# Patient Record
Sex: FEMALE | Race: WHITE | NOT HISPANIC OR LATINO | Employment: UNEMPLOYED | URBAN - NONMETROPOLITAN AREA
[De-identification: names, ages, dates, MRNs, and addresses within clinical notes are randomized per-mention and may not be internally consistent; named-entity substitution may affect disease eponyms.]

---

## 2017-10-25 ENCOUNTER — TELEPHONE (OUTPATIENT)
Dept: URGENT CARE | Facility: CLINIC | Age: 37
End: 2017-10-25

## 2017-10-25 NOTE — TELEPHONE ENCOUNTER
PER [facility] WE ARE SENDING IN A DIFFERENT SCRIPT TO [facility] D/T THE NEED FOR A PA FOR AUGMENTIN.  OMNICEF 300MG BID X7 DAYS CALLED IN    Marlen Razo RN 10/25/2017 9:48 AM

## 2017-11-03 ENCOUNTER — OFFICE VISIT (OUTPATIENT)
Dept: OBSTETRICS AND GYNECOLOGY | Facility: CLINIC | Age: 37
End: 2017-11-03

## 2017-11-03 VITALS
HEIGHT: 65 IN | SYSTOLIC BLOOD PRESSURE: 100 MMHG | BODY MASS INDEX: 17.33 KG/M2 | WEIGHT: 104 LBS | DIASTOLIC BLOOD PRESSURE: 80 MMHG

## 2017-11-03 DIAGNOSIS — Z01.419 ENCOUNTER FOR GYNECOLOGICAL EXAMINATION WITHOUT ABNORMAL FINDING: Primary | ICD-10-CM

## 2017-11-03 DIAGNOSIS — N76.0 ACUTE VAGINITIS: ICD-10-CM

## 2017-11-03 DIAGNOSIS — Z12.31 ENCOUNTER FOR SCREENING MAMMOGRAM FOR MALIGNANT NEOPLASM OF BREAST: ICD-10-CM

## 2017-11-03 PROCEDURE — 87661 TRICHOMONAS VAGINALIS AMPLIF: CPT | Performed by: NURSE PRACTITIONER

## 2017-11-03 PROCEDURE — 87798 DETECT AGENT NOS DNA AMP: CPT | Performed by: NURSE PRACTITIONER

## 2017-11-03 PROCEDURE — 87591 N.GONORRHOEAE DNA AMP PROB: CPT | Performed by: NURSE PRACTITIONER

## 2017-11-03 PROCEDURE — 87481 CANDIDA DNA AMP PROBE: CPT | Performed by: NURSE PRACTITIONER

## 2017-11-03 PROCEDURE — 87624 HPV HI-RISK TYP POOLED RSLT: CPT | Performed by: NURSE PRACTITIONER

## 2017-11-03 PROCEDURE — 87512 GARDNER VAG DNA QUANT: CPT | Performed by: NURSE PRACTITIONER

## 2017-11-03 PROCEDURE — 99385 PREV VISIT NEW AGE 18-39: CPT | Performed by: NURSE PRACTITIONER

## 2017-11-03 PROCEDURE — 87491 CHLMYD TRACH DNA AMP PROBE: CPT | Performed by: NURSE PRACTITIONER

## 2017-11-03 PROCEDURE — G0123 SCREEN CERV/VAG THIN LAYER: HCPCS | Performed by: NURSE PRACTITIONER

## 2017-11-03 RX ORDER — METRONIDAZOLE 500 MG/1
500 TABLET ORAL 2 TIMES DAILY
Qty: 14 TABLET | Refills: 0 | Status: SHIPPED | OUTPATIENT
Start: 2017-11-03 | End: 2017-11-10

## 2017-11-03 NOTE — PROGRESS NOTES
"Pam Jenkins is a 36 y.o. female.     HPI Comments: Annual exam.     The following portions of the patient's history were reviewed and updated as appropriate: allergies, current medications, past family history, past medical history, past social history, past surgical history and problem list.    /80  Ht 65\" (165.1 cm)  Wt 104 lb (47.2 kg)  LMP 10/17/2017  BMI 17.31 kg/m2    Review of Systems   Constitutional: Negative for activity change, appetite change, fatigue and fever.   HENT: Negative for congestion, sore throat and trouble swallowing.    Eyes: Negative for pain, discharge and visual disturbance.   Respiratory: Negative for apnea, shortness of breath and wheezing.    Cardiovascular: Negative for chest pain, palpitations and leg swelling.   Gastrointestinal: Negative for abdominal pain, constipation and diarrhea.   Genitourinary: Positive for pelvic pain (cramping and discomfort. ) and vaginal discharge (increased). Negative for frequency and urgency.   Musculoskeletal: Negative for back pain and gait problem.   Skin: Negative for color change and rash.   Neurological: Negative for dizziness, weakness and numbness.   Psychiatric/Behavioral: Negative for confusion and sleep disturbance.       Objective   Physical Exam   Constitutional: She is oriented to person, place, and time. She appears well-developed and well-nourished. No distress.   HENT:   Head: Normocephalic.   Right Ear: External ear normal.   Left Ear: External ear normal.   Nose: Nose normal.   Mouth/Throat: Oropharynx is clear and moist.   Eyes: Conjunctivae are normal. Right eye exhibits no discharge. Left eye exhibits no discharge. No scleral icterus.   Neck: Normal range of motion. Neck supple. Carotid bruit is not present. No tracheal deviation present. No thyromegaly present.   Cardiovascular: Normal rate, regular rhythm, normal heart sounds and intact distal pulses.    No murmur heard.  Pulmonary/Chest: Effort " normal and breath sounds normal. No respiratory distress. She has no wheezes. Right breast exhibits no inverted nipple, no mass, no nipple discharge, no skin change and no tenderness. Left breast exhibits no inverted nipple, no mass, no nipple discharge, no skin change and no tenderness. Breasts are symmetrical. There is no breast swelling.   Abdominal: Soft. She exhibits no distension and no mass. There is no tenderness. There is no guarding. No hernia. Hernia confirmed negative in the right inguinal area and confirmed negative in the left inguinal area.   Genitourinary: Rectum normal and uterus normal. Rectal exam shows no mass. No breast tenderness, discharge or bleeding. Pelvic exam was performed with patient supine. There is no rash, tenderness, lesion or injury on the right labia. There is no rash, tenderness, lesion or injury on the left labia. Uterus is not enlarged, not fixed and not tender. Cervix exhibits no motion tenderness, no discharge and no friability. Right adnexum displays no mass, no tenderness and no fullness. Left adnexum displays no mass, no tenderness and no fullness. No erythema, tenderness or bleeding in the vagina. No foreign body in the vagina. No signs of injury around the vagina. Vaginal discharge (yellow with odor) found.   Genitourinary Comments: BSU normal  Urethral meatus  Normal  Perineum  Normal   Musculoskeletal: Normal range of motion. She exhibits no edema or tenderness.   Lymphadenopathy:        Head (right side): No submental, no submandibular, no tonsillar, no preauricular, no posterior auricular and no occipital adenopathy present.        Head (left side): No submental, no submandibular, no tonsillar, no preauricular, no posterior auricular and no occipital adenopathy present.     She has no cervical adenopathy.        Right cervical: No superficial cervical, no deep cervical and no posterior cervical adenopathy present.       Left cervical: No superficial cervical, no deep  cervical and no posterior cervical adenopathy present.     She has no axillary adenopathy.        Right: No inguinal adenopathy present.        Left: No inguinal adenopathy present.   Neurological: She is alert and oriented to person, place, and time. Coordination normal.   Skin: Skin is warm and dry. No bruising and no rash noted. She is not diaphoretic. No erythema.   Psychiatric: She has a normal mood and affect. Her behavior is normal. Judgment and thought content normal.   Nursing note and vitals reviewed.      Assessment/Plan    Well woman exam. Pap collected, BV panel, gonorrhea and chlamydia testing added.   Baseline mammogram ordered r/t pt family history of breast cancer.   RV annual exam/prn     Yue was seen today for gynecologic exam.    Diagnoses and all orders for this visit:    Encounter for gynecological examination without abnormal finding  -     Liquid-based Pap Smear, Screening - ThinPrep Vial, Cervix    Acute vaginitis    Encounter for screening mammogram for malignant neoplasm of breast  -     Mammo Screening Digital Tomosynthesis Bilateral With CAD; Future    Other orders  -     metroNIDAZOLE (FLAGYL) 500 MG tablet; Take 1 tablet by mouth 2 (Two) Times a Day for 7 days.

## 2017-11-07 ENCOUNTER — HOSPITAL ENCOUNTER (OUTPATIENT)
Dept: MAMMOGRAPHY | Facility: HOSPITAL | Age: 37
Discharge: HOME | End: 2017-11-07

## 2017-11-07 DIAGNOSIS — Z12.31 ENCOUNTER FOR SCREENING MAMMOGRAM FOR MALIGNANT NEOPLASM OF BREAST: ICD-10-CM

## 2017-11-08 ENCOUNTER — TELEPHONE (OUTPATIENT)
Dept: OBSTETRICS AND GYNECOLOGY | Facility: CLINIC | Age: 37
End: 2017-11-08

## 2017-11-08 DIAGNOSIS — A49.3 MYCOPLASMA INFECTION: Primary | ICD-10-CM

## 2017-11-08 RX ORDER — DOXYCYCLINE HYCLATE 100 MG/1
100 CAPSULE ORAL 2 TIMES DAILY
Qty: 14 CAPSULE | Refills: 0 | Status: SHIPPED | OUTPATIENT
Start: 2017-11-08 | End: 2017-11-15

## 2017-11-08 NOTE — TELEPHONE ENCOUNTER
Pt informed and voiced understanding. Understands to finish the flagyl and will start doxy. Transferred to scheduling to come in 4-6 weeks for test of cure. Pt also to be treated.

## 2017-11-09 ENCOUNTER — TELEPHONE (OUTPATIENT)
Dept: OBSTETRICS AND GYNECOLOGY | Facility: CLINIC | Age: 37
End: 2017-11-09

## 2017-11-10 LAB
GEN CATEG CVX/VAG CYTO-IMP: ABNORMAL
LAB AP CASE REPORT: ABNORMAL
LAB AP GYN ADDITIONAL INFORMATION: ABNORMAL
LAB AP GYN OTHER FINDINGS: ABNORMAL
Lab: ABNORMAL
PATH INTERP SPEC-IMP: ABNORMAL
STAT OF ADQ CVX/VAG CYTO-IMP: ABNORMAL

## 2017-12-06 ENCOUNTER — OFFICE VISIT (OUTPATIENT)
Dept: OBSTETRICS AND GYNECOLOGY | Facility: CLINIC | Age: 37
End: 2017-12-06

## 2017-12-06 VITALS
HEIGHT: 65 IN | BODY MASS INDEX: 17.49 KG/M2 | WEIGHT: 105 LBS | SYSTOLIC BLOOD PRESSURE: 96 MMHG | DIASTOLIC BLOOD PRESSURE: 60 MMHG

## 2017-12-06 DIAGNOSIS — N89.8 VAGINAL DISCHARGE: ICD-10-CM

## 2017-12-06 DIAGNOSIS — Z86.19 HISTORY OF TRICHOMONAL VAGINITIS: Primary | ICD-10-CM

## 2017-12-06 PROCEDURE — 87591 N.GONORRHOEAE DNA AMP PROB: CPT | Performed by: NURSE PRACTITIONER

## 2017-12-06 PROCEDURE — 99213 OFFICE O/P EST LOW 20 MIN: CPT | Performed by: NURSE PRACTITIONER

## 2017-12-06 PROCEDURE — 87481 CANDIDA DNA AMP PROBE: CPT | Performed by: NURSE PRACTITIONER

## 2017-12-06 PROCEDURE — 87491 CHLMYD TRACH DNA AMP PROBE: CPT | Performed by: NURSE PRACTITIONER

## 2017-12-06 PROCEDURE — 87798 DETECT AGENT NOS DNA AMP: CPT | Performed by: NURSE PRACTITIONER

## 2017-12-06 PROCEDURE — 87661 TRICHOMONAS VAGINALIS AMPLIF: CPT | Performed by: NURSE PRACTITIONER

## 2017-12-06 PROCEDURE — 87512 GARDNER VAG DNA QUANT: CPT | Performed by: NURSE PRACTITIONER

## 2017-12-06 NOTE — PROGRESS NOTES
"Subjective   Yue Jenkins is a 37 y.o. female.     HPI Comments: Follow up.     Pt tested positive for Trichomonas, mycoplasma hominis and ureaplasma on 11/3/2017.  Pt reports she took the medication and partner was treated. Today is a GREGORIO.      The following portions of the patient's history were reviewed and updated as appropriate: allergies, current medications, past family history, past medical history, past social history, past surgical history and problem list.    BP 96/60 (BP Location: Left arm, Patient Position: Sitting, Cuff Size: Adult)  Ht 165.1 cm (65\")  Wt 47.6 kg (105 lb)  LMP 11/26/2017 (Exact Date)  BMI 17.47 kg/m2    Review of Systems   Constitutional: Negative for activity change, appetite change, fatigue and fever.   Respiratory: Negative for apnea and shortness of breath.    Cardiovascular: Negative for chest pain and palpitations.   Gastrointestinal: Negative for abdominal distention, abdominal pain, constipation, diarrhea, nausea and vomiting.   Endocrine: Negative for cold intolerance and heat intolerance.   Genitourinary: Negative for difficulty urinating, frequency, menstrual problem, pelvic pain, vaginal discharge and vaginal pain.   Neurological: Negative for headaches.   Psychiatric/Behavioral: Negative for agitation and sleep disturbance.       Objective   Physical Exam   Constitutional: She is oriented to person, place, and time. She appears well-developed.   HENT:   Head: Normocephalic.   Neck: No tracheal deviation present.   Cardiovascular: Normal rate.    Pulmonary/Chest: Breath sounds normal. She has no wheezes.   Abdominal: Soft. There is no tenderness.   Genitourinary: Rectum normal. There is no rash, tenderness or lesion on the right labia. There is no rash, tenderness or lesion on the left labia. Uterus is not deviated, not enlarged and not tender. Cervix exhibits no motion tenderness and no discharge. Right adnexum displays no mass, no tenderness and no fullness. Left " adnexum displays no mass, no tenderness and no fullness. No erythema or tenderness in the vagina. No vaginal discharge found.   Lymphadenopathy:        Right: No inguinal adenopathy present.        Left: No inguinal adenopathy present.   Neurological: She is alert and oriented to person, place, and time. She has normal strength.   Skin: Skin is warm and dry. No rash noted. No cyanosis.   Psychiatric: She has a normal mood and affect. Her speech is normal and behavior is normal.       Assessment/Plan    Reviewed previous results, pt was positive for Trichomonas, mycoplasma hominis and ureaplasma.  Discussed previous results with pt, pt voiced understanding.   Specimen collected for BV panel, gonorrhea and chlamydia testing. Pt   RV annual exam/prn.   Yue was seen today for follow-up.    Diagnoses and all orders for this visit:    History of trichomonal vaginitis  -     Gynecologic Fluid, Supplemental Testing - ThinPrep Vial, Cervix    Vaginal discharge

## 2017-12-11 LAB
GEN CATEG CVX/VAG CYTO-IMP: NORMAL
LAB AP CASE REPORT: NORMAL
Lab: NORMAL
PATH INTERP SPEC-IMP: NORMAL
STAT OF ADQ CVX/VAG CYTO-IMP: NORMAL

## 2017-12-12 ENCOUNTER — TELEPHONE (OUTPATIENT)
Dept: OBSTETRICS AND GYNECOLOGY | Facility: CLINIC | Age: 37
End: 2017-12-12

## 2017-12-12 RX ORDER — DOXYCYCLINE HYCLATE 100 MG/1
100 CAPSULE ORAL 2 TIMES DAILY
Qty: 14 CAPSULE | Refills: 0 | Status: SHIPPED | OUTPATIENT
Start: 2017-12-12 | End: 2017-12-19

## 2019-02-19 ENCOUNTER — OFFICE VISIT (OUTPATIENT)
Dept: RETAIL CLINIC | Facility: CLINIC | Age: 39
End: 2019-02-19

## 2019-02-19 VITALS
OXYGEN SATURATION: 98 % | TEMPERATURE: 98.4 F | HEART RATE: 72 BPM | RESPIRATION RATE: 20 BRPM | DIASTOLIC BLOOD PRESSURE: 64 MMHG | SYSTOLIC BLOOD PRESSURE: 112 MMHG

## 2019-02-19 DIAGNOSIS — J32.9 CHRONIC SINUSITIS, UNSPECIFIED LOCATION: Primary | ICD-10-CM

## 2019-02-19 DIAGNOSIS — G89.29 CHRONIC DENTAL PAIN: ICD-10-CM

## 2019-02-19 DIAGNOSIS — K08.9 CHRONIC DENTAL PAIN: ICD-10-CM

## 2019-02-19 PROCEDURE — 99213 OFFICE O/P EST LOW 20 MIN: CPT | Performed by: NURSE PRACTITIONER

## 2019-02-19 RX ORDER — AMOXICILLIN AND CLAVULANATE POTASSIUM 875; 125 MG/1; MG/1
1 TABLET, FILM COATED ORAL 2 TIMES DAILY
Qty: 20 TABLET | Refills: 0 | Status: SHIPPED | OUTPATIENT
Start: 2019-02-19 | End: 2019-03-01

## 2019-02-19 RX ORDER — FLUTICASONE PROPIONATE 50 MCG
2 SPRAY, SUSPENSION (ML) NASAL DAILY
Qty: 18.2 ML | Refills: 0 | Status: SHIPPED | OUTPATIENT
Start: 2019-02-19 | End: 2019-03-29

## 2019-02-19 NOTE — PATIENT INSTRUCTIONS
Drink plenty of fluids (water or Gatorade)   May take tylenol or ibuprofen for mild pain or fever   May take Mucinex as directed for congestion if needed   Return in 7-10 days if symptoms persist or worsen    Sinusitis, Adult  Sinusitis is soreness and inflammation of your sinuses. Sinuses are hollow spaces in the bones around your face. Your sinuses are located:  · Around your eyes.  · In the middle of your forehead.  · Behind your nose.  · In your cheekbones.    Your sinuses and nasal passages are lined with a stringy fluid (mucus). Mucus normally drains out of your sinuses. When your nasal tissues become inflamed or swollen, the mucus can become trapped or blocked so air cannot flow through your sinuses. This allows bacteria, viruses, and funguses to grow, which leads to infection.  Sinusitis can develop quickly and last for 7?10 days (acute) or for more than 12 weeks (chronic). Sinusitis often develops after a cold.  What are the causes?  This condition is caused by anything that creates swelling in the sinuses or stops mucus from draining, including:  · Allergies.  · Asthma.  · Bacterial or viral infection.  · Abnormally shaped bones between the nasal passages.  · Nasal growths that contain mucus (nasal polyps).  · Narrow sinus openings.  · Pollutants, such as chemicals or irritants in the air.  · A foreign object stuck in the nose.  · A fungal infection. This is rare.    What increases the risk?  The following factors may make you more likely to develop this condition:  · Having allergies or asthma.  · Having had a recent cold or respiratory tract infection.  · Having structural deformities or blockages in your nose or sinuses.  · Having a weak immune system.  · Doing a lot of swimming or diving.  · Overusing nasal sprays.  · Smoking.    What are the signs or symptoms?  The main symptoms of this condition are pain and a feeling of pressure around the affected sinuses. Other symptoms include:  · Upper  toothache.  · Earache.  · Headache.  · Bad breath.  · Decreased sense of smell and taste.  · A cough that may get worse at night.  · Fatigue.  · Fever.  · Thick drainage from your nose. The drainage is often green and it may contain pus (purulent).  · Stuffy nose or congestion.  · Postnasal drip. This is when extra mucus collects in the throat or back of the nose.  · Swelling and warmth over the affected sinuses.  · Sore throat.  · Sensitivity to light.    How is this diagnosed?  This condition is diagnosed based on symptoms, a medical history, and a physical exam. To find out if your condition is acute or chronic, your health care provider may:  · Look in your nose for signs of nasal polyps.  · Tap over the affected sinus to check for signs of infection.  · View the inside of your sinuses using an imaging device that has a light attached (endoscope).    If your health care provider suspects that you have chronic sinusitis, you may also:  · Be tested for allergies.  · Have a sample of mucus taken from your nose (nasal culture) and checked for bacteria.  · Have a mucus sample examined to see if your sinusitis is related to an allergy.    If your sinusitis does not respond to treatment and it lasts longer than 8 weeks, you may have an MRI or CT scan to check your sinuses. These scans also help to determine how severe your infection is.  In rare cases, a bone biopsy may be done to rule out more serious types of fungal sinus disease.  How is this treated?  Treatment for sinusitis depends on the cause and whether your condition is chronic or acute. If a virus is causing your sinusitis, your symptoms will go away on their own within 10 days. You may be given medicines to relieve your symptoms, including:  · Topical nasal decongestants. They shrink swollen nasal passages and let mucus drain from your sinuses.  · Antihistamines. These drugs block inflammation that is triggered by allergies. This can help to ease swelling in  your nose and sinuses.  · Topical nasal corticosteroids. These are nasal sprays that ease inflammation and swelling in your nose and sinuses.  · Nasal saline washes. These rinses can help to get rid of thick mucus in your nose.    If your condition is caused by bacteria, you will be given an antibiotic medicine. If your condition is caused by a fungus, you will be given an antifungal medicine.  Surgery may be needed to correct underlying conditions, such as narrow nasal passages. Surgery may also be needed to remove polyps.  Follow these instructions at home:  Medicines  · Take, use, or apply over-the-counter and prescription medicines only as told by your health care provider. These may include nasal sprays.  · If you were prescribed an antibiotic medicine, take it as told by your health care provider. Do not stop taking the antibiotic even if you start to feel better.  Hydrate and Humidify  · Drink enough water to keep your urine clear or pale yellow. Staying hydrated will help to thin your mucus.  · Use a cool mist humidifier to keep the humidity level in your home above 50%.  · Inhale steam for 10-15 minutes, 3-4 times a day or as told by your health care provider. You can do this in the bathroom while a hot shower is running.  · Limit your exposure to cool or dry air.  Rest  · Rest as much as possible.  · Sleep with your head raised (elevated).  · Make sure to get enough sleep each night.  General instructions  · Apply a warm, moist washcloth to your face 3-4 times a day or as told by your health care provider. This will help with discomfort.  · Wash your hands often with soap and water to reduce your exposure to viruses and other germs. If soap and water are not available, use hand .  · Do not smoke. Avoid being around people who are smoking (secondhand smoke).  · Keep all follow-up visits as told by your health care provider. This is important.  Contact a health care provider if:  · You have a  fever.  · Your symptoms get worse.  · Your symptoms do not improve within 10 days.  Get help right away if:  · You have a severe headache.  · You have persistent vomiting.  · You have pain or swelling around your face or eyes.  · You have vision problems.  · You develop confusion.  · Your neck is stiff.  · You have trouble breathing.  This information is not intended to replace advice given to you by your health care provider. Make sure you discuss any questions you have with your health care provider.  Document Released: 12/18/2006 Document Revised: 08/13/2017 Document Reviewed: 10/12/2016  [employer] Interactive Patient Education © 2018 [employer]

## 2019-02-19 NOTE — PROGRESS NOTES
Chief Complaint   Patient presents with   • Earache   • Dental Pain     Subjective   Yue Jenkins is a 38 y.o. female who presents to the clinic today with complaints left ear pain and sinus pressure along with dental pain to the left portion of the upper and lower dental cavities. Dental pain started 3-4 weeks ago and patient saw dentist and stated that there was no abscess and that it was most likely a sinus infection.     Earache    There is pain in the left ear. This is a new problem. The current episode started 1 to 4 weeks ago. The problem occurs constantly. The problem has been gradually worsening. There has been no fever. Associated symptoms include headaches. Pertinent negatives include no abdominal pain, coughing, diarrhea, ear discharge, hearing loss, neck pain, rash, rhinorrhea, sore throat or vomiting. She has tried acetaminophen, antibiotics and NSAIDs for the symptoms. The treatment provided no relief. There is no history of a chronic ear infection, hearing loss or a tympanostomy tube.   Dental Pain    Associated symptoms include sinus pressure.         Current Outpatient Medications:   •  amoxicillin-clavulanate (AUGMENTIN) 875-125 MG per tablet, Take 1 tablet by mouth 2 (Two) Times a Day for 10 days., Disp: 20 tablet, Rfl: 0  •  fluticasone (FLONASE) 50 MCG/ACT nasal spray, 2 sprays into the nostril(s) as directed by provider Daily., Disp: 18.2 mL, Rfl: 0    Allergies:  Patient has no known allergies.    History reviewed. No pertinent past medical history.  Past Surgical History:   Procedure Laterality Date   • DILATATION AND CURETTAGE     • TUBAL ABDOMINAL LIGATION       Family History   Problem Relation Age of Onset   • No Known Problems Mother    • No Known Problems Father    • No Known Problems Sister    • No Known Problems Brother    • No Known Problems Paternal Grandmother    • No Known Problems Brother    • Breast cancer Other    • Ovarian cancer Neg Hx    • Uterine cancer Neg Hx    •  Colon cancer Neg Hx      Social History     Tobacco Use   • Smoking status: Current Every Day Smoker     Packs/day: 0.50     Types: Cigarettes   • Smokeless tobacco: Never Used   Substance Use Topics   • Alcohol use: Yes     Comment: occasional wine   • Drug use: No       Review of Systems  Review of Systems   Constitutional: Positive for activity change, appetite change and fatigue.   HENT: Positive for dental problem, ear pain and sinus pressure. Negative for ear discharge, hearing loss, rhinorrhea, sinus pain, sore throat and trouble swallowing.    Respiratory: Negative for cough, choking, shortness of breath and wheezing.    Gastrointestinal: Negative for abdominal pain, diarrhea, nausea and vomiting.   Musculoskeletal: Negative for back pain, myalgias and neck pain.   Skin: Negative for rash.   Neurological: Positive for headaches.   Hematological: Negative for adenopathy.       Objective   /64 (BP Location: Left arm, Patient Position: Sitting, Cuff Size: Adult)   Pulse 72   Temp 98.4 °F (36.9 °C) (Tympanic)   Resp 20   SpO2 98%       Physical Exam   Constitutional: She is oriented to person, place, and time. She appears well-developed and well-nourished.   HENT:   Head: Normocephalic.   Right Ear: Hearing, external ear and ear canal normal. Tympanic membrane is injected.   Left Ear: Hearing, external ear and ear canal normal. Tympanic membrane is injected.   Mouth/Throat: Oropharynx is clear and moist. Normal dentition. No dental abscesses or dental caries. No posterior oropharyngeal edema or posterior oropharyngeal erythema.   Eyes: Conjunctivae are normal. Pupils are equal, round, and reactive to light.   Neck: Normal range of motion.   Cardiovascular: Normal rate, regular rhythm and normal heart sounds.   Pulmonary/Chest: Effort normal and breath sounds normal. She has no wheezes. She has no rales.   Musculoskeletal: Normal range of motion.   Lymphadenopathy:     She has no cervical adenopathy.    Neurological: She is alert and oriented to person, place, and time.   Skin: Skin is warm and dry.   Psychiatric: She has a normal mood and affect. Her behavior is normal. Judgment and thought content normal.       Assessment/Plan     Yue was seen today for earache and dental pain.    Diagnoses and all orders for this visit:    Chronic sinusitis, unspecified location  -     Ambulatory Referral to ENT (Otolaryngology)    Chronic dental pain    Other orders  -     amoxicillin-clavulanate (AUGMENTIN) 875-125 MG per tablet; Take 1 tablet by mouth 2 (Two) Times a Day for 10 days.  -     fluticasone (FLONASE) 50 MCG/ACT nasal spray; 2 sprays into the nostril(s) as directed by provider Daily.      Drink plenty of fluids (water or Gatorade)   May take tylenol or ibuprofen for mild pain or fever   May take Mucinex as directed for congestion if needed   Return in 7-10 days if symptoms persist or worsen    Justin Dumont NP student, assisted in assessment, diagnosis and treatment plan

## 2019-03-18 ENCOUNTER — OFFICE VISIT (OUTPATIENT)
Dept: OTOLARYNGOLOGY | Facility: CLINIC | Age: 39
End: 2019-03-18

## 2019-03-18 VITALS
DIASTOLIC BLOOD PRESSURE: 70 MMHG | BODY MASS INDEX: 17.11 KG/M2 | TEMPERATURE: 97.8 F | SYSTOLIC BLOOD PRESSURE: 108 MMHG | HEIGHT: 67 IN | WEIGHT: 109 LBS

## 2019-03-18 DIAGNOSIS — K08.9 CHRONIC DENTAL PAIN: ICD-10-CM

## 2019-03-18 DIAGNOSIS — R51.9 FACIAL PAIN: ICD-10-CM

## 2019-03-18 DIAGNOSIS — M26.609 TMJ (TEMPOROMANDIBULAR JOINT DISORDER): ICD-10-CM

## 2019-03-18 DIAGNOSIS — J30.9 ALLERGIC RHINITIS, UNSPECIFIED SEASONALITY, UNSPECIFIED TRIGGER: Primary | ICD-10-CM

## 2019-03-18 DIAGNOSIS — R68.84 JAW PAIN: ICD-10-CM

## 2019-03-18 DIAGNOSIS — G89.29 CHRONIC DENTAL PAIN: ICD-10-CM

## 2019-03-18 PROCEDURE — 99214 OFFICE O/P EST MOD 30 MIN: CPT | Performed by: NURSE PRACTITIONER

## 2019-03-18 RX ORDER — METHYLPREDNISOLONE 4 MG/1
TABLET ORAL
Qty: 1 EACH | Refills: 0 | Status: SHIPPED | OUTPATIENT
Start: 2019-03-18 | End: 2019-03-24

## 2019-03-18 NOTE — PATIENT INSTRUCTIONS
Temporomandibular Joint Syndrome  Temporomandibular joint (TMJ) syndrome is a condition that affects the joints between your jaw and your skull. The TMJs are located near your ears and allow your jaw to open and close. These joints and the nearby muscles are involved in all movements of the jaw. People with TMJ syndrome have pain in the area of these joints and muscles. Chewing, biting, or other movements of the jaw can be difficult or painful.  TMJ syndrome can be caused by various things. In many cases, the condition is mild and goes away within a few weeks. For some people, the condition can become a long-term problem.  What are the causes?  Possible causes of TMJ syndrome include:  · Grinding your teeth or clenching your jaw. Some people do this when they are under stress.  · Arthritis.  · Injury to the jaw.  · Head or neck injury.  · Teeth or dentures that are not aligned well.    In some cases, the cause of TMJ syndrome may not be known.  What are the signs or symptoms?  The most common symptom is an aching pain on the side of the head in the area of the TMJ. Other symptoms may include:  · Pain when moving your jaw, such as when chewing or biting.  · Being unable to open your jaw all the way.  · Making a clicking sound when you open your mouth.  · Headache.  · Earache.  · Neck or shoulder pain.    How is this diagnosed?  Diagnosis can usually be made based on your symptoms, your medical history, and a physical exam. Your health care provider may check the range of motion of your jaw. Imaging tests, such as X-rays or an MRI, are sometimes done. You may need to see your dentist to determine if your teeth and jaw are lined up correctly.  How is this treated?  TMJ syndrome often goes away on its own. If treatment is needed, the options may include:  · Eating soft foods and applying ice or heat.  · Medicines to relieve pain or inflammation.  · Medicines to relax the muscles.  · A splint, bite plate, or mouthpiece  to prevent teeth grinding or jaw clenching.  · Relaxation techniques or counseling to help reduce stress.  · Transcutaneous electrical nerve stimulation (TENS). This helps to relieve pain by applying an electrical current through the skin.  · Acupuncture. This is sometimes helpful to relieve pain.  · Jaw surgery. This is rarely needed.    Follow these instructions at home:  · Take medicines only as directed by your health care provider.  · Eat a soft diet if you are having trouble chewing.  · Apply ice to the painful area.  ? Put ice in a plastic bag.  ? Place a towel between your skin and the bag.  ? Leave the ice on for 20 minutes, 2-3 times a day.  · Apply a warm compress to the painful area as directed.  · Massage your jaw area and perform any jaw stretching exercises as recommended by your health care provider.  · If you were given a mouthpiece or bite plate, wear it as directed.  · Avoid foods that require a lot of chewing. Do not chew gum.  · Keep all follow-up visits as directed by your health care provider. This is important.  Contact a health care provider if:  · You are having trouble eating.  · You have new or worsening symptoms.  Get help right away if:  · Your jaw locks open or closed.  This information is not intended to replace advice given to you by your health care provider. Make sure you discuss any questions you have with your health care provider.  Document Released: 09/12/2002 Document Revised: 08/17/2017 Document Reviewed: 07/23/2015  [employer] Interactive Patient Education © 2019 [employer]

## 2019-03-18 NOTE — PROGRESS NOTES
YOB: 1980  Location: [facility]  Location Address: [address] [place] [address], [facility], [address] [place] [address]  Location Phone: [phone]    Chief Complaint   Patient presents with   • Sinus Problem       History of Present Illness  Yue Jenkins is a 38 y.o. female.  Yue Jenkins is here for evaluation of ENT complaints. The patient has had problems with nasal congestion, repeated sinusitis, sinus pressure, facial pressure and headaches  The symptoms are not localized to a particular location. The patient has had moderate symptoms. The symptoms have been present for the last several months The symptoms are aggravated by  no identifiable factors. The symptoms are improved by no identifiable factors.  She did have her teeth examined for a dental process however nothing was going on on the affected left side.  She has been eating peanuts.       History reviewed. No pertinent past medical history.    Past Surgical History:   Procedure Laterality Date   • DILATATION AND CURETTAGE     • TUBAL ABDOMINAL LIGATION         Outpatient Medications Marked as Taking for the 3/18/19 encounter (Office Visit) with Joana Forbes APRN   Medication Sig Dispense Refill   • fluticasone (FLONASE) 50 MCG/ACT nasal spray 2 sprays into the nostril(s) as directed by provider Daily. 18.2 mL 0       Patient has no known allergies.    Family History   Problem Relation Age of Onset   • No Known Problems Mother    • No Known Problems Father    • No Known Problems Sister    • No Known Problems Brother    • No Known Problems Paternal Grandmother    • No Known Problems Brother    • Breast cancer Other    • Ovarian cancer Neg Hx    • Uterine cancer Neg Hx    • Colon cancer Neg Hx        Social History     Socioeconomic History   • Marital status:      Spouse name: Not on file   • Number of children: Not on file   • Years of education: Not on file   • Highest education level: Not on file   Social Needs   • Financial resource  strain: Not on file   • Food insecurity - worry: Not on file   • Food insecurity - inability: Not on file   • Transportation needs - medical: Not on file   • Transportation needs - non-medical: Not on file   Occupational History   • Not on file   Tobacco Use   • Smoking status: Current Every Day Smoker     Packs/day: 0.50     Types: Cigarettes   • Smokeless tobacco: Never Used   Substance and Sexual Activity   • Alcohol use: Yes     Comment: occasional wine   • Drug use: No   • Sexual activity: Yes   Other Topics Concern   • Not on file   Social History Narrative   • Not on file       Review of Systems   Constitutional: Negative.    HENT:        SEE HPI   Eyes: Negative.    Respiratory: Negative.    Cardiovascular: Negative.    Gastrointestinal: Negative.    Endocrine: Negative.    Genitourinary: Negative.    Musculoskeletal: Negative.    Skin: Negative.    Allergic/Immunologic: Positive for environmental allergies.   Neurological: Negative.    Hematological: Negative.    Psychiatric/Behavioral: Negative.        Vitals:    03/18/19 0917   BP: 108/70   Temp: 97.8 °F (36.6 °C)       Body mass index is 17.07 kg/m².    Objective     Physical Exam  CONSTITUTIONAL: well nourished, alert, oriented, in no acute distress     COMMUNICATION AND VOICE: able to communicate normally, normal voice quality    HEAD: normocephalic, no lesions, atraumatic, no tenderness, no masses     FACE: appearance normal, no lesions, no tenderness, no deformities, facial motion symmetric    SALIVARY GLANDS: parotid glands with no tenderness, no swelling, no masses, submandibular glands with normal size, nontender    EYES: ocular motility normal, eyelids normal, orbits normal, no proptosis, conjunctiva normal , pupils equal, round     EARS:  Hearing: response to conversational voice normal bilaterally   External Ears: auricles without lesions  Otoscopic: tympanic membrane appearance normal, no lesions, no perforation, normal mobility, no  fluid    NOSE:  External Nose: structure normal, no tenderness on palpation, no nasal discharge, no lesions, no evidence of trauma, nostrils patent   Intranasal Exam: nasal mucosa normal, vestibule within normal limits, inferior turbinate normal, nasal septum midline     ORAL:  Lips: upper and lower lips without lesion   Teeth: dentition within normal limits for age   Gums: gingivae healthy   Oral Mucosa: oral mucosa normal, no mucosal lesions   Floor of Mouth: Warthin’s duct patent, mucosa normal  Tongue: lingual mucosa normal without lesions, normal tongue mobility   Palate: soft and hard palates with normal mucosa and structure  Oropharynx: oropharyngeal mucosa normal    NECK: neck appearance normal, no mass,  noted without erythema or tenderness    LYMPH NODES: no lymphadenopathy    CHEST/RESPIRATORY: respiratory effort normal,    CARDIOVASCULAR: extremities without cyanosis or edema      NEUROLOGIC/PSYCHIATRIC: oriented to time, place and person, mood normal, affect appropriate, CN II-XII intact grossly    Assessment/Plan   Yue was seen today for sinus problem.    Diagnoses and all orders for this visit:    Allergic rhinitis, unspecified seasonality, unspecified trigger  -     CT sinus wo contrast; Future    Facial pain  -     CT sinus wo contrast; Future    Chronic dental pain  -     CT sinus wo contrast; Future    Jaw pain  -     CT sinus wo contrast; Future    TMJ (temporomandibular joint disorder)  -     CT sinus wo contrast; Future    Other orders  -     methylPREDNISolone (MEDROL) 4 MG tablet; Take as directed on package instructions.      * Surgery not found *  Orders Placed This Encounter   Procedures   • CT sinus wo contrast     Standing Status:   Future     Standing Expiration Date:   3/18/2020     Scheduling Instructions:      Image guided, burn CD, stealth protocol     Order Specific Question:   Patient Pregnant     Answer:   No     Order Specific Question:   Is this exam for pre-operative  purposes?     Answer:   Yes     Return in about 6 weeks (around 4/29/2019).       Patient Instructions   Temporomandibular Joint Syndrome  Temporomandibular joint (TMJ) syndrome is a condition that affects the joints between your jaw and your skull. The TMJs are located near your ears and allow your jaw to open and close. These joints and the nearby muscles are involved in all movements of the jaw. People with TMJ syndrome have pain in the area of these joints and muscles. Chewing, biting, or other movements of the jaw can be difficult or painful.  TMJ syndrome can be caused by various things. In many cases, the condition is mild and goes away within a few weeks. For some people, the condition can become a long-term problem.  What are the causes?  Possible causes of TMJ syndrome include:  · Grinding your teeth or clenching your jaw. Some people do this when they are under stress.  · Arthritis.  · Injury to the jaw.  · Head or neck injury.  · Teeth or dentures that are not aligned well.    In some cases, the cause of TMJ syndrome may not be known.  What are the signs or symptoms?  The most common symptom is an aching pain on the side of the head in the area of the TMJ. Other symptoms may include:  · Pain when moving your jaw, such as when chewing or biting.  · Being unable to open your jaw all the way.  · Making a clicking sound when you open your mouth.  · Headache.  · Earache.  · Neck or shoulder pain.    How is this diagnosed?  Diagnosis can usually be made based on your symptoms, your medical history, and a physical exam. Your health care provider may check the range of motion of your jaw. Imaging tests, such as X-rays or an MRI, are sometimes done. You may need to see your dentist to determine if your teeth and jaw are lined up correctly.  How is this treated?  TMJ syndrome often goes away on its own. If treatment is needed, the options may include:  · Eating soft foods and applying ice or heat.  · Medicines to  relieve pain or inflammation.  · Medicines to relax the muscles.  · A splint, bite plate, or mouthpiece to prevent teeth grinding or jaw clenching.  · Relaxation techniques or counseling to help reduce stress.  · Transcutaneous electrical nerve stimulation (TENS). This helps to relieve pain by applying an electrical current through the skin.  · Acupuncture. This is sometimes helpful to relieve pain.  · Jaw surgery. This is rarely needed.    Follow these instructions at home:  · Take medicines only as directed by your health care provider.  · Eat a soft diet if you are having trouble chewing.  · Apply ice to the painful area.  ? Put ice in a plastic bag.  ? Place a towel between your skin and the bag.  ? Leave the ice on for 20 minutes, 2-3 times a day.  · Apply a warm compress to the painful area as directed.  · Massage your jaw area and perform any jaw stretching exercises as recommended by your health care provider.  · If you were given a mouthpiece or bite plate, wear it as directed.  · Avoid foods that require a lot of chewing. Do not chew gum.  · Keep all follow-up visits as directed by your health care provider. This is important.  Contact a health care provider if:  · You are having trouble eating.  · You have new or worsening symptoms.  Get help right away if:  · Your jaw locks open or closed.  This information is not intended to replace advice given to you by your health care provider. Make sure you discuss any questions you have with your health care provider.  Document Released: 09/12/2002 Document Revised: 08/17/2017 Document Reviewed: 07/23/2015  [employer] Patient Education © 2019 [employer]

## 2019-03-22 ENCOUNTER — APPOINTMENT (OUTPATIENT)
Dept: CT IMAGING | Facility: HOSPITAL | Age: 39
End: 2019-03-22

## 2019-03-27 ENCOUNTER — HOSPITAL ENCOUNTER (OUTPATIENT)
Dept: CT IMAGING | Facility: HOSPITAL | Age: 39
Discharge: HOME | End: 2019-03-27
Admitting: NURSE PRACTITIONER

## 2019-03-27 DIAGNOSIS — G89.29 CHRONIC DENTAL PAIN: ICD-10-CM

## 2019-03-27 DIAGNOSIS — K08.9 CHRONIC DENTAL PAIN: ICD-10-CM

## 2019-03-27 DIAGNOSIS — R51.9 FACIAL PAIN: ICD-10-CM

## 2019-03-27 DIAGNOSIS — J30.9 ALLERGIC RHINITIS, UNSPECIFIED SEASONALITY, UNSPECIFIED TRIGGER: ICD-10-CM

## 2019-03-27 DIAGNOSIS — M26.609 TMJ (TEMPOROMANDIBULAR JOINT DISORDER): ICD-10-CM

## 2019-03-27 DIAGNOSIS — R68.84 JAW PAIN: ICD-10-CM

## 2019-03-27 PROCEDURE — 70486 CT MAXILLOFACIAL W/O DYE: CPT

## 2019-03-29 ENCOUNTER — APPOINTMENT (OUTPATIENT)
Dept: CT IMAGING | Facility: HOSPITAL | Age: 39
End: 2019-03-29

## 2019-03-29 ENCOUNTER — OFFICE VISIT (OUTPATIENT)
Dept: INTERNAL MEDICINE | Facility: CLINIC | Age: 39
End: 2019-03-29

## 2019-03-29 ENCOUNTER — LAB (OUTPATIENT)
Dept: LAB | Facility: HOSPITAL | Age: 39
End: 2019-03-29

## 2019-03-29 VITALS
HEART RATE: 84 BPM | SYSTOLIC BLOOD PRESSURE: 108 MMHG | TEMPERATURE: 98.7 F | DIASTOLIC BLOOD PRESSURE: 74 MMHG | WEIGHT: 105.38 LBS | OXYGEN SATURATION: 99 % | BODY MASS INDEX: 16.54 KG/M2 | HEIGHT: 67 IN | RESPIRATION RATE: 12 BRPM

## 2019-03-29 DIAGNOSIS — R68.84 JAW PAIN: Primary | ICD-10-CM

## 2019-03-29 DIAGNOSIS — F32.A ANXIETY AND DEPRESSION: ICD-10-CM

## 2019-03-29 DIAGNOSIS — Z13.220 LIPID SCREENING: ICD-10-CM

## 2019-03-29 DIAGNOSIS — F41.9 ANXIETY AND DEPRESSION: ICD-10-CM

## 2019-03-29 DIAGNOSIS — R68.84 JAW PAIN: ICD-10-CM

## 2019-03-29 LAB
ALBUMIN SERPL-MCNC: 4.5 G/DL (ref 3.5–5)
ALBUMIN/GLOB SERPL: 1.5 G/DL (ref 1.1–2.5)
ALP SERPL-CCNC: 65 U/L (ref 24–120)
ALT SERPL W P-5'-P-CCNC: <15 U/L (ref 0–54)
ANION GAP SERPL CALCULATED.3IONS-SCNC: 9 MMOL/L (ref 4–13)
ARTICHOKE IGE QN: 99 MG/DL (ref 0–99)
AST SERPL-CCNC: 18 U/L (ref 7–45)
BILIRUB SERPL-MCNC: 0.3 MG/DL (ref 0.1–1)
BUN BLD-MCNC: 11 MG/DL (ref 5–21)
BUN/CREAT SERPL: 19.3 (ref 7–25)
CALCIUM SPEC-SCNC: 10 MG/DL (ref 8.4–10.4)
CHLORIDE SERPL-SCNC: 103 MMOL/L (ref 98–110)
CHOLEST SERPL-MCNC: 165 MG/DL (ref 130–200)
CO2 SERPL-SCNC: 30 MMOL/L (ref 24–31)
CREAT BLD-MCNC: 0.57 MG/DL (ref 0.5–1.4)
DEPRECATED RDW RBC AUTO: 46.9 FL (ref 40–54)
ERYTHROCYTE [DISTWIDTH] IN BLOOD BY AUTOMATED COUNT: 13.5 % (ref 12–15)
GFR SERPL CREATININE-BSD FRML MDRD: 119 ML/MIN/1.73
GLOBULIN UR ELPH-MCNC: 3.1 GM/DL
GLUCOSE BLD-MCNC: 98 MG/DL (ref 70–100)
HCT VFR BLD AUTO: 42.2 % (ref 37–47)
HDLC SERPL-MCNC: 53 MG/DL
HGB BLD-MCNC: 13.8 G/DL (ref 12–16)
LDLC/HDLC SERPL: 1.81 {RATIO}
MCH RBC QN AUTO: 30.8 PG (ref 28–32)
MCHC RBC AUTO-ENTMCNC: 32.7 G/DL (ref 33–36)
MCV RBC AUTO: 94.2 FL (ref 82–98)
PLATELET # BLD AUTO: 410 10*3/MM3 (ref 130–400)
PMV BLD AUTO: 10.4 FL (ref 6–12)
POTASSIUM BLD-SCNC: 4.1 MMOL/L (ref 3.5–5.3)
PROT SERPL-MCNC: 7.6 G/DL (ref 6.3–8.7)
RBC # BLD AUTO: 4.48 10*6/MM3 (ref 4.2–5.4)
SODIUM BLD-SCNC: 142 MMOL/L (ref 135–145)
TRIGL SERPL-MCNC: 80 MG/DL (ref 0–149)
WBC NRBC COR # BLD: 11.63 10*3/MM3 (ref 4.8–10.8)

## 2019-03-29 PROCEDURE — 85027 COMPLETE CBC AUTOMATED: CPT | Performed by: NURSE PRACTITIONER

## 2019-03-29 PROCEDURE — 36415 COLL VENOUS BLD VENIPUNCTURE: CPT

## 2019-03-29 PROCEDURE — 80061 LIPID PANEL: CPT | Performed by: NURSE PRACTITIONER

## 2019-03-29 PROCEDURE — 99214 OFFICE O/P EST MOD 30 MIN: CPT | Performed by: NURSE PRACTITIONER

## 2019-03-29 PROCEDURE — 80053 COMPREHEN METABOLIC PANEL: CPT | Performed by: NURSE PRACTITIONER

## 2019-03-29 RX ORDER — NAPROXEN SODIUM 220 MG
220 TABLET ORAL 2 TIMES DAILY PRN
COMMUNITY
End: 2019-04-12

## 2019-03-29 RX ORDER — CYCLOBENZAPRINE HCL 5 MG
5 TABLET ORAL NIGHTLY
Qty: 30 TABLET | Refills: 0 | Status: SHIPPED | OUTPATIENT
Start: 2019-03-29 | End: 2019-04-12 | Stop reason: SDUPTHER

## 2019-03-29 RX ORDER — INDOMETHACIN 50 MG/1
50 CAPSULE ORAL
Qty: 60 CAPSULE | Refills: 0 | Status: SHIPPED | OUTPATIENT
Start: 2019-03-29 | End: 2019-04-12

## 2019-03-29 RX ORDER — VENLAFAXINE HYDROCHLORIDE 75 MG/1
75 CAPSULE, EXTENDED RELEASE ORAL DAILY
Qty: 30 CAPSULE | Refills: 0 | Status: SHIPPED | OUTPATIENT
Start: 2019-03-29 | End: 2019-04-26 | Stop reason: SDUPTHER

## 2019-03-29 RX ORDER — VENLAFAXINE HYDROCHLORIDE 37.5 MG/1
37.5 CAPSULE, EXTENDED RELEASE ORAL DAILY
Qty: 7 CAPSULE | Refills: 0 | Status: SHIPPED | OUTPATIENT
Start: 2019-03-29 | End: 2019-04-12

## 2019-04-12 ENCOUNTER — OFFICE VISIT (OUTPATIENT)
Dept: INTERNAL MEDICINE | Facility: CLINIC | Age: 39
End: 2019-04-12

## 2019-04-12 VITALS
TEMPERATURE: 98.8 F | DIASTOLIC BLOOD PRESSURE: 66 MMHG | RESPIRATION RATE: 12 BRPM | WEIGHT: 107.06 LBS | HEART RATE: 88 BPM | BODY MASS INDEX: 16.8 KG/M2 | SYSTOLIC BLOOD PRESSURE: 104 MMHG | HEIGHT: 67 IN | OXYGEN SATURATION: 98 %

## 2019-04-12 DIAGNOSIS — F32.A ANXIETY AND DEPRESSION: ICD-10-CM

## 2019-04-12 DIAGNOSIS — R68.84 JAW PAIN: Primary | ICD-10-CM

## 2019-04-12 DIAGNOSIS — F41.9 ANXIETY AND DEPRESSION: ICD-10-CM

## 2019-04-12 PROCEDURE — 99214 OFFICE O/P EST MOD 30 MIN: CPT | Performed by: NURSE PRACTITIONER

## 2019-04-12 RX ORDER — CYCLOBENZAPRINE HCL 10 MG
10 TABLET ORAL NIGHTLY
Qty: 30 TABLET | Refills: 0 | Status: SHIPPED | OUTPATIENT
Start: 2019-04-12 | End: 2021-08-18

## 2019-04-12 RX ORDER — IBUPROFEN 200 MG
400 TABLET ORAL EVERY 4 HOURS PRN
COMMUNITY
End: 2021-08-18

## 2019-04-12 RX ORDER — KETOROLAC TROMETHAMINE 10 MG/1
10 TABLET, FILM COATED ORAL EVERY 6 HOURS PRN
Qty: 20 TABLET | Refills: 0 | Status: SHIPPED | OUTPATIENT
Start: 2019-04-12 | End: 2019-04-26 | Stop reason: SDUPTHER

## 2019-04-12 NOTE — PROGRESS NOTES
CC: follow up jaw pain     History:  Yue Jenkins is a 38 y.o. female who presents today for evaluation of the above problems.    Reports that the jaw pain does not seem as aggressive as it was previously, but it is definitely still there.  At this point, she has taken oral steroids and anti-inflammatories.  She is using a bite guard at night.  She admits that she quit taking the indomethacin because it made her yawn and yawning hurt her jaw.  She requests Lortab and states that this has previously not made her tired and actually had the opposite effect on her.   She has not been able to see an improvement in anxiety and depression with Effexor, but has not had any adverse effects of the medication either. This was started at her visit 2 weeks ago.     ROS:  Review of Systems   Musculoskeletal:        Left sided jaw pain.   Psychiatric/Behavioral: Positive for dysphoric mood. The patient is nervous/anxious.        No Known Allergies  Past Medical History:   Diagnosis Date   • Anxiety    • Depression      Past Surgical History:   Procedure Laterality Date   • DILATATION AND CURETTAGE     • TUBAL ABDOMINAL LIGATION     • WISDOM TOOTH EXTRACTION       Family History   Problem Relation Age of Onset   • No Known Problems Mother    • No Known Problems Father    • No Known Problems Sister    • No Known Problems Brother    • No Known Problems Paternal Grandmother    • No Known Problems Brother    • Breast cancer Other    • Ovarian cancer Neg Hx    • Uterine cancer Neg Hx    • Colon cancer Neg Hx       reports that she has been smoking cigarettes.  She has been smoking about 1.00 pack per day. She has never used smokeless tobacco. She reports that she drinks alcohol. She reports that she does not use drugs.      Current Outpatient Medications:   •  cyclobenzaprine (FLEXERIL) 10 MG tablet, Take 1 tablet by mouth Every Night., Disp: 30 tablet, Rfl: 0  •  ibuprofen (ADVIL,MOTRIN) 200 MG tablet, Take 400 mg by mouth Every 4  "(Four) Hours As Needed for Mild Pain ., Disp: , Rfl:   •  venlafaxine XR (EFFEXOR-XR) 75 MG 24 hr capsule, Take 1 capsule by mouth Daily., Disp: 30 capsule, Rfl: 0  •  ketorolac (TORADOL) 10 MG tablet, Take 1 tablet by mouth Every 6 (Six) Hours As Needed for Moderate Pain ., Disp: 20 tablet, Rfl: 0    OBJECTIVE:  /66 (BP Location: Left arm, Patient Position: Sitting, Cuff Size: Adult)   Pulse 88   Temp 98.8 °F (37.1 °C) (Temporal)   Resp 12   Ht 170.2 cm (67\")   Wt 48.6 kg (107 lb 1 oz)   LMP 03/31/2019   SpO2 98%   BMI 16.77 kg/m²    Physical Exam   Constitutional: She is oriented to person, place, and time. Vital signs are normal. She appears well-developed and well-nourished.   Cardiovascular: Normal rate.   Pulmonary/Chest: Effort normal.   Neurological: She is alert and oriented to person, place, and time.   Psychiatric: She has a normal mood and affect. Her behavior is normal.   Vitals reviewed.      Assessment/Plan    Yue was seen today for follow-up and jaw pain.    Diagnoses and all orders for this visit:    Jaw pain  -     Ambulatory Referral to Oral Maxillofacial Surgery  -     ketorolac (TORADOL) 10 MG tablet; Take 1 tablet by mouth Every 6 (Six) Hours As Needed for Moderate Pain .  -     cyclobenzaprine (FLEXERIL) 10 MG tablet; Take 1 tablet by mouth Every Night.  Will increase nightly muscle relaxer to 10 mg and also change to toradol from indomethacin. Do not feel that opioid therapy is necessary, particularly since she states that symptoms are mildly improved.  If Toradol is ineffective may consider a short course of ultram for very limited use.    Anxiety and depression  Continue Effexor and follow up in two weeks to consider dose adjustment.      An After Visit Summary was printed and given to the patient at discharge.  Return in about 2 weeks (around 4/26/2019).         KAIN Rainey  4/12/2019   "

## 2019-04-15 DIAGNOSIS — R68.84 JAW PAIN: ICD-10-CM

## 2019-04-15 RX ORDER — INDOMETHACIN 50 MG/1
50 CAPSULE ORAL
Qty: 60 CAPSULE | Refills: 0 | OUTPATIENT
Start: 2019-04-15

## 2019-04-15 RX ORDER — FLUTICASONE PROPIONATE 50 MCG
2 SPRAY, SUSPENSION (ML) NASAL DAILY
Qty: 16 ML | Refills: 0 | OUTPATIENT
Start: 2019-04-15

## 2019-04-25 DIAGNOSIS — F41.9 ANXIETY AND DEPRESSION: ICD-10-CM

## 2019-04-25 DIAGNOSIS — F32.A ANXIETY AND DEPRESSION: ICD-10-CM

## 2019-04-25 RX ORDER — VENLAFAXINE HYDROCHLORIDE 75 MG/1
CAPSULE, EXTENDED RELEASE ORAL
Qty: 30 CAPSULE | Refills: 0 | OUTPATIENT
Start: 2019-04-25

## 2019-04-25 NOTE — TELEPHONE ENCOUNTER
JG MEHTA, HER MOM, WOULD LIKE TO SPEAK WITH SOMEONE ABOUT MOLLY. SHE SAYS MOLLY IS NOT DOING WELL..SHE SAYS MOLLY HAS MIGRAINES. SHE FEELS LIKE MOLLY DOESN'T WANT TO BOTHER ANYONE. SHE ALSO SAYS HER JAW HURTS HER.

## 2019-04-26 ENCOUNTER — OFFICE VISIT (OUTPATIENT)
Dept: INTERNAL MEDICINE | Facility: CLINIC | Age: 39
End: 2019-04-26

## 2019-04-26 VITALS
OXYGEN SATURATION: 99 % | BODY MASS INDEX: 16.35 KG/M2 | DIASTOLIC BLOOD PRESSURE: 76 MMHG | WEIGHT: 104.19 LBS | HEART RATE: 96 BPM | RESPIRATION RATE: 12 BRPM | HEIGHT: 67 IN | SYSTOLIC BLOOD PRESSURE: 120 MMHG | TEMPERATURE: 99.8 F

## 2019-04-26 DIAGNOSIS — F41.9 ANXIETY AND DEPRESSION: ICD-10-CM

## 2019-04-26 DIAGNOSIS — R68.84 JAW PAIN: Primary | ICD-10-CM

## 2019-04-26 DIAGNOSIS — F32.A ANXIETY AND DEPRESSION: ICD-10-CM

## 2019-04-26 PROCEDURE — 99214 OFFICE O/P EST MOD 30 MIN: CPT | Performed by: NURSE PRACTITIONER

## 2019-04-26 RX ORDER — VENLAFAXINE HYDROCHLORIDE 37.5 MG/1
37.5 CAPSULE, EXTENDED RELEASE ORAL DAILY
Qty: 30 CAPSULE | Refills: 0 | Status: SHIPPED | OUTPATIENT
Start: 2019-04-26 | End: 2019-05-23 | Stop reason: SDUPTHER

## 2019-04-26 RX ORDER — KETOROLAC TROMETHAMINE 10 MG/1
10 TABLET, FILM COATED ORAL EVERY 6 HOURS PRN
Qty: 20 TABLET | Refills: 0 | Status: SHIPPED | OUTPATIENT
Start: 2019-04-26 | End: 2021-08-18

## 2019-04-26 NOTE — PROGRESS NOTES
CC: follow up jaw pain    History:  Yue Jenkins is a 38 y.o. female who presents today for evaluation of the above problems.      Reports that she woke up this morning and her jaw pain was gone.  Until today, it had not resolved.     Wednesday had migraine, which she had never had before.  However, it did go away after taking tylenol.    Mood has been doing well on effexor and she feels that she can come off of this.  She is concerned over having withdrawals from  the medication.     Temp of 99.8.      ROS:  Review of Systems   HENT:        Jaw pain resolved   Neurological: Negative for headaches.   Psychiatric/Behavioral: Negative for dysphoric mood. The patient is not nervous/anxious.        No Known Allergies  Past Medical History:   Diagnosis Date   • Anxiety    • Depression      Past Surgical History:   Procedure Laterality Date   • DILATATION AND CURETTAGE     • TUBAL ABDOMINAL LIGATION     • WISDOM TOOTH EXTRACTION       Family History   Problem Relation Age of Onset   • No Known Problems Mother    • No Known Problems Father    • No Known Problems Sister    • No Known Problems Brother    • No Known Problems Paternal Grandmother    • No Known Problems Brother    • Breast cancer Other    • Ovarian cancer Neg Hx    • Uterine cancer Neg Hx    • Colon cancer Neg Hx       reports that she has been smoking cigarettes.  She has been smoking about 1.00 pack per day. She has never used smokeless tobacco. She reports that she drinks alcohol. She reports that she does not use drugs.      Current Outpatient Medications:   •  cyclobenzaprine (FLEXERIL) 10 MG tablet, Take 1 tablet by mouth Every Night., Disp: 30 tablet, Rfl: 0  •  ibuprofen (ADVIL,MOTRIN) 200 MG tablet, Take 400 mg by mouth Every 4 (Four) Hours As Needed for Mild Pain ., Disp: , Rfl:   •  venlafaxine XR (EFFEXOR-XR) 37.5 MG 24 hr capsule, Take 1 capsule by mouth Daily., Disp: 30 capsule, Rfl: 0  •  ketorolac (TORADOL) 10 MG tablet, Take 1 tablet by  "mouth Every 6 (Six) Hours As Needed for Moderate Pain ., Disp: 20 tablet, Rfl: 0    OBJECTIVE:  /76 (BP Location: Left arm, Patient Position: Sitting, Cuff Size: Adult)   Pulse 96   Temp 99.8 °F (37.7 °C) (Temporal)   Resp 12   Ht 170.2 cm (67\")   Wt 47.3 kg (104 lb 3 oz)   LMP 03/31/2019   SpO2 99%   BMI 16.32 kg/m²    Physical Exam   Constitutional: She is oriented to person, place, and time. Vital signs are normal. She appears well-developed and well-nourished.   Cardiovascular: Normal rate.   Pulmonary/Chest: Effort normal.   Neurological: She is alert and oriented to person, place, and time.   Psychiatric: She has a normal mood and affect. Her behavior is normal.   Vitals reviewed.      Assessment/Plan    Yue was seen today for jaw pain.    Diagnoses and all orders for this visit:    Jaw pain  -     ketorolac (TORADOL) 10 MG tablet; Take 1 tablet by mouth Every 6 (Six) Hours As Needed for Moderate Pain .    Will refill toradol in case jaw pain returns.  She is advised that should this happen she should make an appointment with oral surgery as they do not accept referrals.       Anxiety and depression  -     venlafaxine XR (EFFEXOR-XR) 37.5 MG 24 hr capsule; Take 1 capsule by mouth Daily.  Will cut dose in 1/2 to start weaning.  Next month will take every other day, and then stop medication.     An After Visit Summary was printed and given to the patient at discharge.  Return in about 6 months (around 10/26/2019).       Fely Gilman, KAIN  4/26/2019   "

## 2019-05-23 DIAGNOSIS — F41.9 ANXIETY AND DEPRESSION: ICD-10-CM

## 2019-05-23 DIAGNOSIS — F32.A ANXIETY AND DEPRESSION: ICD-10-CM

## 2019-05-23 RX ORDER — VENLAFAXINE HYDROCHLORIDE 37.5 MG/1
37.5 CAPSULE, EXTENDED RELEASE ORAL EVERY OTHER DAY
Qty: 15 CAPSULE | Refills: 0 | Status: SHIPPED | OUTPATIENT
Start: 2019-05-23 | End: 2021-08-18

## 2021-08-18 ENCOUNTER — OFFICE VISIT (OUTPATIENT)
Dept: INTERNAL MEDICINE | Facility: CLINIC | Age: 41
End: 2021-08-18

## 2021-08-18 ENCOUNTER — PRIOR AUTHORIZATION (OUTPATIENT)
Dept: INTERNAL MEDICINE | Facility: CLINIC | Age: 41
End: 2021-08-18

## 2021-08-18 VITALS
WEIGHT: 98.4 LBS | HEIGHT: 65 IN | BODY MASS INDEX: 16.39 KG/M2 | DIASTOLIC BLOOD PRESSURE: 75 MMHG | OXYGEN SATURATION: 98 % | HEART RATE: 92 BPM | SYSTOLIC BLOOD PRESSURE: 118 MMHG | TEMPERATURE: 98.9 F

## 2021-08-18 DIAGNOSIS — D69.59 THROMBOCYTOPENIA DUE TO COVID-19 VIRUS: ICD-10-CM

## 2021-08-18 DIAGNOSIS — U07.1 THROMBOCYTOPENIA DUE TO COVID-19 VIRUS: ICD-10-CM

## 2021-08-18 DIAGNOSIS — R63.4 WEIGHT LOSS, NON-INTENTIONAL: ICD-10-CM

## 2021-08-18 DIAGNOSIS — M79.604 BILATERAL LEG PAIN: Primary | ICD-10-CM

## 2021-08-18 DIAGNOSIS — M79.605 BILATERAL LEG PAIN: Primary | ICD-10-CM

## 2021-08-18 PROCEDURE — 99213 OFFICE O/P EST LOW 20 MIN: CPT | Performed by: INTERNAL MEDICINE

## 2021-08-18 RX ORDER — GABAPENTIN 300 MG/1
300 CAPSULE ORAL 3 TIMES DAILY
Qty: 90 CAPSULE | Refills: 0 | Status: SHIPPED | OUTPATIENT
Start: 2021-08-18 | End: 2021-10-12

## 2021-08-18 RX ORDER — DRONABINOL 5 MG/1
5 CAPSULE ORAL
Qty: 60 CAPSULE | Refills: 0 | Status: SHIPPED | OUTPATIENT
Start: 2021-08-18 | End: 2022-09-15

## 2021-08-18 NOTE — PROGRESS NOTES
Subjective     Chief Complaint   Patient presents with   • Leg Pain     Hurting & Throbing goest from hip to foot,   has burning and tingling feelling       History of Present Illness  Hard to concentrate and want to eat because of pain in both legs.    Started about two weeks ago without incident.   Doesn't feel like sciatic type pain.   Feels like her whole leg is detoriating. Can be the leg, butt cheek, posterior aspect of the leg.   More on the right than on the left.   Constant throbbing and burning pain. No pain with palpation. No swelling.     Painful periods. Worse after her third child.     Patient's PMR from outside medical facility reviewed and noted.    Review of Systems   Constitutional: Positive for unexpected weight change. Negative for chills and fever.   HENT: Negative for congestion and rhinorrhea.    Respiratory: Negative for cough and shortness of breath.    Cardiovascular: Negative for chest pain and leg swelling.   Gastrointestinal: Negative for constipation and diarrhea.   Genitourinary: Negative for dysuria and hematuria.   Musculoskeletal: Positive for arthralgias. Negative for joint swelling.      Otherwise complete ROS reviewed and negative except as mentioned in the HPI.    Past Medical History:   Past Medical History:   Diagnosis Date   • Anxiety    • Depression      Past Surgical History:  Past Surgical History:   Procedure Laterality Date   • DILATATION AND CURETTAGE     • TUBAL ABDOMINAL LIGATION     • WISDOM TOOTH EXTRACTION       Social History:  reports that she has been smoking cigarettes. She has a 27.00 pack-year smoking history. She has never used smokeless tobacco. She reports previous alcohol use. She reports that she does not use drugs.    Family History: family history includes Breast cancer in an other family member; Endometriosis in her mother; No Known Problems in her brother, brother, father, paternal grandmother, and sister.       Allergies:  No Known  "Allergies  Medications:  Prior to Admission medications    Medication Sig Start Date End Date Taking? Authorizing Provider   cyclobenzaprine (FLEXERIL) 10 MG tablet Take 1 tablet by mouth Every Night. 4/12/19 8/18/21  Fely Gilman APRN   ibuprofen (ADVIL,MOTRIN) 200 MG tablet Take 400 mg by mouth Every 4 (Four) Hours As Needed for Mild Pain .  8/18/21  Provider, MD Magy   ketorolac (TORADOL) 10 MG tablet Take 1 tablet by mouth Every 6 (Six) Hours As Needed for Moderate Pain . 4/26/19 8/18/21  Fely Gilman APRN   venlafaxine XR (EFFEXOR-XR) 37.5 MG 24 hr capsule Take 1 capsule by mouth Every Other Day. 5/23/19 8/18/21  Shruthi Boss APRN       Objective     Vital Signs: /75 (BP Location: Left arm, Patient Position: Sitting, Cuff Size: Small Adult)   Pulse 92   Temp 98.9 °F (37.2 °C) (Infrared)   Ht 165.1 cm (65\")   Wt 44.6 kg (98 lb 6.4 oz)   SpO2 98%   Breastfeeding No   BMI 16.37 kg/m²   Physical Exam  Vitals reviewed.   Constitutional:       Comments: Thin    HENT:      Head: Normocephalic and atraumatic.      Nose: Nose normal.   Eyes:      General: No scleral icterus.     Conjunctiva/sclera: Conjunctivae normal.   Cardiovascular:      Rate and Rhythm: Normal rate and regular rhythm.      Heart sounds: Normal heart sounds.   Pulmonary:      Effort: Pulmonary effort is normal.      Breath sounds: Normal breath sounds.   Musculoskeletal:         General: No tenderness.      Cervical back: Normal range of motion and neck supple.   Skin:     General: Skin is warm and dry.   Neurological:      Mental Status: She is alert.      Cranial Nerves: No cranial nerve deficit.   Psychiatric:      Comments: Tearful in the office.        Patient's Body mass index is 16.37 kg/m². indicating that she is within normal range (BMI 18.5-24.9). No BMI management plan needed..      Results Reviewed:  Glucose   Date Value Ref Range Status   03/29/2019 98 70 - 100 mg/dL Final     BUN   Date Value Ref Range " Status   03/29/2019 11 5 - 21 mg/dL Final     Creatinine   Date Value Ref Range Status   03/29/2019 0.57 0.50 - 1.40 mg/dL Final     Sodium   Date Value Ref Range Status   03/29/2019 142 135 - 145 mmol/L Final     Potassium   Date Value Ref Range Status   03/29/2019 4.1 3.5 - 5.3 mmol/L Final     Chloride   Date Value Ref Range Status   03/29/2019 103 98 - 110 mmol/L Final     CO2   Date Value Ref Range Status   03/29/2019 30.0 24.0 - 31.0 mmol/L Final     Calcium   Date Value Ref Range Status   03/29/2019 10.0 8.4 - 10.4 mg/dL Final     ALT (SGPT)   Date Value Ref Range Status   03/29/2019 <15 0 - 54 U/L Final     AST (SGOT)   Date Value Ref Range Status   03/29/2019 18 7 - 45 U/L Final     WBC   Date Value Ref Range Status   03/29/2019 11.63 (H) 4.80 - 10.80 10*3/mm3 Final     Hematocrit   Date Value Ref Range Status   03/29/2019 42.2 37.0 - 47.0 % Final     Platelets   Date Value Ref Range Status   03/29/2019 410 (H) 130 - 400 10*3/mm3 Final     Total Cholesterol   Date Value Ref Range Status   03/29/2019 165 130 - 200 mg/dL Final     Triglycerides   Date Value Ref Range Status   03/29/2019 80 0 - 149 mg/dL Final     HDL Cholesterol   Date Value Ref Range Status   03/29/2019 53 >=50 mg/dL Final     LDL Cholesterol    Date Value Ref Range Status   03/29/2019 99 0 - 99 mg/dL Final     LDL/HDL Ratio   Date Value Ref Range Status   03/29/2019 1.81  Final         Assessment / Plan     Assessment/Plan:  1. Bilateral leg pain  - Comprehensive metabolic panel  - CK  - XR Spine Lumbar 2 or 3 View (In Office)  - gabapentin (Neurontin) 300 MG capsule; Take 1 capsule by mouth 3 (Three) Times a Day.  Dispense: 90 capsule; Refill: 0    2. Thrombocytopenia due to COVID-19 virus  - CBC w AUTO Differential    3. Weight loss, non-intentional  - T4  - TSH  - Vitamin D 25 hydroxy  - Vitamin B12  - dronabinol (Marinol) 5 MG capsule; Take 1 capsule by mouth 2 (Two) Times a Day Before Meals.  Dispense: 60 capsule; Refill: 0  -  ZARIA      Return in about 2 weeks (around 9/1/2021) for Recheck, Next scheduled follow up. unless patient needs to be seen sooner or acute issues arise.    Code Status: Full    I have discussed the patient results/orders and and plan/recommendation with them at today's visit.      Krissy Burr, DO   08/18/2021

## 2021-08-19 LAB
25(OH)D3+25(OH)D2 SERPL-MCNC: 29.8 NG/ML (ref 30–100)
ALBUMIN SERPL-MCNC: 4.8 G/DL (ref 3.8–4.8)
ALBUMIN/GLOB SERPL: 1.8 {RATIO} (ref 1.2–2.2)
ALP SERPL-CCNC: 90 IU/L (ref 48–121)
ALT SERPL-CCNC: 9 IU/L (ref 0–32)
ANA SER QL: NEGATIVE
AST SERPL-CCNC: 11 IU/L (ref 0–40)
BASOPHILS # BLD AUTO: 0 X10E3/UL (ref 0–0.2)
BASOPHILS NFR BLD AUTO: 0 %
BILIRUB SERPL-MCNC: 0.3 MG/DL (ref 0–1.2)
BUN SERPL-MCNC: 8 MG/DL (ref 6–20)
BUN/CREAT SERPL: 12 (ref 9–23)
CALCIUM SERPL-MCNC: 9.3 MG/DL (ref 8.7–10.2)
CHLORIDE SERPL-SCNC: 102 MMOL/L (ref 96–106)
CK SERPL-CCNC: 92 U/L (ref 32–182)
CO2 SERPL-SCNC: 24 MMOL/L (ref 20–29)
CREAT SERPL-MCNC: 0.67 MG/DL (ref 0.57–1)
EOSINOPHIL # BLD AUTO: 0 X10E3/UL (ref 0–0.4)
EOSINOPHIL NFR BLD AUTO: 0 %
ERYTHROCYTE [DISTWIDTH] IN BLOOD BY AUTOMATED COUNT: 11.8 % (ref 11.7–15.4)
GLOBULIN SER CALC-MCNC: 2.6 G/DL (ref 1.5–4.5)
GLUCOSE SERPL-MCNC: 98 MG/DL (ref 65–99)
HCT VFR BLD AUTO: 42.6 % (ref 34–46.6)
HGB BLD-MCNC: 14.1 G/DL (ref 11.1–15.9)
IMM GRANULOCYTES # BLD AUTO: 0 X10E3/UL (ref 0–0.1)
IMM GRANULOCYTES NFR BLD AUTO: 0 %
LYMPHOCYTES # BLD AUTO: 1.5 X10E3/UL (ref 0.7–3.1)
LYMPHOCYTES NFR BLD AUTO: 11 %
MCH RBC QN AUTO: 31.4 PG (ref 26.6–33)
MCHC RBC AUTO-ENTMCNC: 33.1 G/DL (ref 31.5–35.7)
MCV RBC AUTO: 95 FL (ref 79–97)
MONOCYTES # BLD AUTO: 0.4 X10E3/UL (ref 0.1–0.9)
MONOCYTES NFR BLD AUTO: 3 %
NEUTROPHILS # BLD AUTO: 11 X10E3/UL (ref 1.4–7)
NEUTROPHILS NFR BLD AUTO: 86 %
PLATELET # BLD AUTO: 362 X10E3/UL (ref 150–450)
POTASSIUM SERPL-SCNC: 4.1 MMOL/L (ref 3.5–5.2)
PROT SERPL-MCNC: 7.4 G/DL (ref 6–8.5)
RBC # BLD AUTO: 4.49 X10E6/UL (ref 3.77–5.28)
SODIUM SERPL-SCNC: 141 MMOL/L (ref 134–144)
T4 SERPL-MCNC: 9.8 UG/DL (ref 4.5–12)
TSH SERPL DL<=0.005 MIU/L-ACNC: 0.48 UIU/ML (ref 0.45–4.5)
VIT B12 SERPL-MCNC: 329 PG/ML (ref 232–1245)
WBC # BLD AUTO: 12.9 X10E3/UL (ref 3.4–10.8)

## 2021-08-31 ENCOUNTER — OFFICE VISIT (OUTPATIENT)
Dept: INTERNAL MEDICINE | Facility: CLINIC | Age: 41
End: 2021-08-31

## 2021-08-31 VITALS
HEART RATE: 76 BPM | HEIGHT: 65 IN | WEIGHT: 98.8 LBS | TEMPERATURE: 97.1 F | BODY MASS INDEX: 16.46 KG/M2 | OXYGEN SATURATION: 98 % | SYSTOLIC BLOOD PRESSURE: 111 MMHG | DIASTOLIC BLOOD PRESSURE: 73 MMHG

## 2021-08-31 DIAGNOSIS — D72.829 LEUKOCYTOSIS, UNSPECIFIED TYPE: ICD-10-CM

## 2021-08-31 DIAGNOSIS — M54.10 BACK PAIN WITH RADICULOPATHY: ICD-10-CM

## 2021-08-31 DIAGNOSIS — E55.9 VITAMIN D DEFICIENCY: ICD-10-CM

## 2021-08-31 DIAGNOSIS — Z79.899 LONG TERM USE OF DRUG: Primary | ICD-10-CM

## 2021-08-31 DIAGNOSIS — M25.551 RIGHT HIP PAIN: ICD-10-CM

## 2021-08-31 PROCEDURE — 99214 OFFICE O/P EST MOD 30 MIN: CPT | Performed by: INTERNAL MEDICINE

## 2021-08-31 RX ORDER — HYDROCODONE BITARTRATE AND ACETAMINOPHEN 7.5; 325 MG/1; MG/1
1 TABLET ORAL EVERY 6 HOURS PRN
Qty: 30 TABLET | Refills: 0 | Status: SHIPPED | OUTPATIENT
Start: 2021-08-31 | End: 2021-09-21 | Stop reason: SDUPTHER

## 2021-08-31 NOTE — PROGRESS NOTES
Subjective     Chief Complaint   Patient presents with   • Bilateral leg pain     2 wk fu       History of Present Illness  Patient states that she has gotten thrush. Got magic mouth wash called in for her. States that it is getting better.     Unable to use My Chart.   Hasn't lost any weight.     Ongoing right leg pain.   Gabapentin is causing memory issues.     Marinol did not help that much. Had to pay out of pocket.    Labs given to and discussed with patient.     Patient's PMR from outside medical facility reviewed and noted.    Review of Systems   Constitutional: Negative for chills and fever.   HENT: Negative for congestion and rhinorrhea.    Respiratory: Negative for cough and shortness of breath.    Cardiovascular: Negative for chest pain and leg swelling.   Gastrointestinal: Negative for constipation and diarrhea.        Difficulty eating. Believes it is related to pain.    Genitourinary: Negative for dysuria and hematuria.   Musculoskeletal: Positive for arthralgias, back pain and gait problem.      Otherwise complete ROS reviewed and negative except as mentioned in the HPI.    Past Medical History:   Past Medical History:   Diagnosis Date   • Anxiety    • Depression      Past Surgical History:  Past Surgical History:   Procedure Laterality Date   • DILATATION AND CURETTAGE     • TUBAL ABDOMINAL LIGATION     • WISDOM TOOTH EXTRACTION       Social History:  reports that she has been smoking cigarettes. She has a 27.00 pack-year smoking history. She has never used smokeless tobacco. She reports previous alcohol use. She reports that she does not use drugs.    Family History: family history includes Breast cancer in an other family member; Endometriosis in her mother; No Known Problems in her brother, brother, father, paternal grandmother, and sister.       Allergies:  No Known Allergies  Medications:  Prior to Admission medications    Medication Sig Start Date End Date Taking? Authorizing Provider  "  dronabinol (Marinol) 5 MG capsule Take 1 capsule by mouth 2 (Two) Times a Day Before Meals. 8/18/21  Yes Krissy Burr DO   gabapentin (Neurontin) 300 MG capsule Take 1 capsule by mouth 3 (Three) Times a Day. 8/18/21  Yes Krissy Burr DO       Objective     Vital Signs: /73 (BP Location: Left arm, Patient Position: Sitting, Cuff Size: Adult)   Pulse 76   Temp 97.1 °F (36.2 °C) (Infrared)   Ht 165.1 cm (65\")   Wt 44.8 kg (98 lb 12.8 oz)   SpO2 98%   Breastfeeding No   BMI 16.44 kg/m²   Physical Exam  Vitals reviewed.   HENT:      Head: Normocephalic and atraumatic.      Nose: Nose normal.   Eyes:      General: No scleral icterus.     Conjunctiva/sclera: Conjunctivae normal.   Cardiovascular:      Rate and Rhythm: Normal rate and regular rhythm.      Heart sounds: Normal heart sounds.   Pulmonary:      Effort: Pulmonary effort is normal.      Breath sounds: Normal breath sounds.   Musculoskeletal:         General: No tenderness.      Cervical back: Normal range of motion and neck supple.   Skin:     General: Skin is warm and dry.   Neurological:      Mental Status: She is alert.      Cranial Nerves: No cranial nerve deficit.      Motor: Weakness present.      Comments: +3/5 right LE strength. Pain in the SI joint and hip area.    Psychiatric:         Mood and Affect: Mood normal.         Behavior: Behavior normal.       Patient's Body mass index is 16.44 kg/m². indicating that she is underweight (BMI < 18.5). Recommendations include: treating the underlying disease process.      Results Reviewed:  Glucose   Date Value Ref Range Status   03/29/2019 98 70 - 100 mg/dL Final     BUN   Date Value Ref Range Status   08/18/2021 8 6 - 20 mg/dL Final   03/29/2019 11 5 - 21 mg/dL Final     Creatinine   Date Value Ref Range Status   08/18/2021 0.67 0.57 - 1.00 mg/dL Final   03/29/2019 0.57 0.50 - 1.40 mg/dL Final     Sodium   Date Value Ref Range Status   08/18/2021 141 134 - 144 mmol/L Final "   03/29/2019 142 135 - 145 mmol/L Final     Potassium   Date Value Ref Range Status   08/18/2021 4.1 3.5 - 5.2 mmol/L Final   03/29/2019 4.1 3.5 - 5.3 mmol/L Final     Chloride   Date Value Ref Range Status   08/18/2021 102 96 - 106 mmol/L Final   03/29/2019 103 98 - 110 mmol/L Final     CO2   Date Value Ref Range Status   03/29/2019 30.0 24.0 - 31.0 mmol/L Final     Total CO2   Date Value Ref Range Status   08/18/2021 24 20 - 29 mmol/L Final     Calcium   Date Value Ref Range Status   08/18/2021 9.3 8.7 - 10.2 mg/dL Final   03/29/2019 10.0 8.4 - 10.4 mg/dL Final     ALT (SGPT)   Date Value Ref Range Status   08/18/2021 9 0 - 32 IU/L Final   03/29/2019 <15 0 - 54 U/L Final     AST (SGOT)   Date Value Ref Range Status   08/18/2021 11 0 - 40 IU/L Final   03/29/2019 18 7 - 45 U/L Final     WBC   Date Value Ref Range Status   08/18/2021 12.9 (H) 3.4 - 10.8 x10E3/uL Final     Hematocrit   Date Value Ref Range Status   08/18/2021 42.6 34.0 - 46.6 % Final   03/29/2019 42.2 37.0 - 47.0 % Final     Platelets   Date Value Ref Range Status   08/18/2021 362 150 - 450 x10E3/uL Final   03/29/2019 410 (H) 130 - 400 10*3/mm3 Final     Total Cholesterol   Date Value Ref Range Status   03/29/2019 165 130 - 200 mg/dL Final     Triglycerides   Date Value Ref Range Status   03/29/2019 80 0 - 149 mg/dL Final     HDL Cholesterol   Date Value Ref Range Status   03/29/2019 53 >=50 mg/dL Final     LDL Cholesterol    Date Value Ref Range Status   03/29/2019 99 0 - 99 mg/dL Final     LDL/HDL Ratio   Date Value Ref Range Status   03/29/2019 1.81  Final         Assessment / Plan     Assessment/Plan:  1. Long term use of drug  - Compliance Drug Analysis, Ur - Urine, Clean Catch    2. Back pain with radiculopathy  - MRI Lumbar Spine Without Contrast; Future  - Ambulatory Referral to Pain Management  - HYDROcodone-acetaminophen (Norco) 7.5-325 MG per tablet; Take 1 tablet by mouth Every 6 (Six) Hours As Needed for Moderate Pain .  Dispense: 30  tablet; Refill: 0    3. Leukocytosis, unspecified type  - Ambulatory Referral to Hematology    4. Vitamin D deficiency  - OTC supplement    5. Right hip pain  - CT Lower Extremity Right Without Contrast; Future        Return in about 4 weeks (around 9/28/2021) for Recheck, Next scheduled follow up. unless patient needs to be seen sooner or acute issues arise.    Code Status: Full    I have discussed the patient results/orders and and plan/recommendation with them at today's visit.      Krissy Burr,    08/31/2021

## 2021-09-04 LAB — DRUGS UR: NORMAL

## 2021-09-16 ENCOUNTER — CONSULT (OUTPATIENT)
Dept: ONCOLOGY | Facility: CLINIC | Age: 41
End: 2021-09-16

## 2021-09-16 ENCOUNTER — TELEPHONE (OUTPATIENT)
Dept: INTERNAL MEDICINE | Facility: CLINIC | Age: 41
End: 2021-09-16

## 2021-09-16 ENCOUNTER — LAB (OUTPATIENT)
Dept: LAB | Facility: HOSPITAL | Age: 41
End: 2021-09-16

## 2021-09-16 VITALS
SYSTOLIC BLOOD PRESSURE: 110 MMHG | TEMPERATURE: 98.7 F | WEIGHT: 98 LBS | HEIGHT: 65 IN | HEART RATE: 97 BPM | DIASTOLIC BLOOD PRESSURE: 90 MMHG | RESPIRATION RATE: 18 BRPM | OXYGEN SATURATION: 97 % | BODY MASS INDEX: 16.33 KG/M2

## 2021-09-16 DIAGNOSIS — F17.200 SMOKER: ICD-10-CM

## 2021-09-16 DIAGNOSIS — D72.829 LEUKOCYTOSIS, UNSPECIFIED TYPE: ICD-10-CM

## 2021-09-16 DIAGNOSIS — D72.829 LEUKOCYTOSIS, UNSPECIFIED TYPE: Primary | ICD-10-CM

## 2021-09-16 LAB
ALBUMIN SERPL-MCNC: 5.1 G/DL (ref 3.5–5.2)
ALBUMIN/GLOB SERPL: 1.8 G/DL
ALP SERPL-CCNC: 87 U/L (ref 39–117)
ALT SERPL W P-5'-P-CCNC: 13 U/L (ref 1–33)
ANION GAP SERPL CALCULATED.3IONS-SCNC: 10 MMOL/L (ref 5–15)
AST SERPL-CCNC: 14 U/L (ref 1–32)
BASOPHILS # BLD AUTO: 0.04 10*3/MM3 (ref 0–0.2)
BASOPHILS NFR BLD AUTO: 0.3 % (ref 0–1.5)
BILIRUB SERPL-MCNC: 0.4 MG/DL (ref 0–1.2)
BUN SERPL-MCNC: 8 MG/DL (ref 6–20)
BUN/CREAT SERPL: 15.1 (ref 7–25)
CALCIUM SPEC-SCNC: 9.4 MG/DL (ref 8.6–10.5)
CHLORIDE SERPL-SCNC: 106 MMOL/L (ref 98–107)
CO2 SERPL-SCNC: 26 MMOL/L (ref 22–29)
CREAT SERPL-MCNC: 0.53 MG/DL (ref 0.57–1)
CYTOLOGIST CVX/VAG CYTO: NORMAL
DEPRECATED RDW RBC AUTO: 45.8 FL (ref 37–54)
EOSINOPHIL # BLD AUTO: 0.01 10*3/MM3 (ref 0–0.4)
EOSINOPHIL NFR BLD AUTO: 0.1 % (ref 0.3–6.2)
ERYTHROCYTE [DISTWIDTH] IN BLOOD BY AUTOMATED COUNT: 12.9 % (ref 12.3–15.4)
FERRITIN SERPL-MCNC: 57.47 NG/ML (ref 13–150)
GFR SERPL CREATININE-BSD FRML MDRD: 128 ML/MIN/1.73
GLOBULIN UR ELPH-MCNC: 2.8 GM/DL
GLUCOSE SERPL-MCNC: 98 MG/DL (ref 65–99)
HBV SURFACE AG SERPL QL IA: NORMAL
HCT VFR BLD AUTO: 45.4 % (ref 34–46.6)
HCV AB SER DONR QL: NORMAL
HGB BLD-MCNC: 14.7 G/DL (ref 12–15.9)
IMM GRANULOCYTES # BLD AUTO: 0.04 10*3/MM3 (ref 0–0.05)
IMM GRANULOCYTES NFR BLD AUTO: 0.3 % (ref 0–0.5)
IRON 24H UR-MRATE: 128 MCG/DL (ref 37–145)
IRON SATN MFR SERPL: 36 % (ref 20–50)
LYMPHOCYTES # BLD AUTO: 1.59 10*3/MM3 (ref 0.7–3.1)
LYMPHOCYTES NFR BLD AUTO: 13.4 % (ref 19.6–45.3)
MCH RBC QN AUTO: 31.1 PG (ref 26.6–33)
MCHC RBC AUTO-ENTMCNC: 32.4 G/DL (ref 31.5–35.7)
MCV RBC AUTO: 96 FL (ref 79–97)
MONOCYTES # BLD AUTO: 0.65 10*3/MM3 (ref 0.1–0.9)
MONOCYTES NFR BLD AUTO: 5.5 % (ref 5–12)
NEUTROPHILS NFR BLD AUTO: 80.4 % (ref 42.7–76)
NEUTROPHILS NFR BLD AUTO: 9.55 10*3/MM3 (ref 1.7–7)
NRBC BLD AUTO-RTO: 0 /100 WBC (ref 0–0.2)
PATH INTERP BLD-IMP: NORMAL
PLATELET # BLD AUTO: 359 10*3/MM3 (ref 140–450)
PMV BLD AUTO: 10.7 FL (ref 6–12)
POTASSIUM SERPL-SCNC: 3.9 MMOL/L (ref 3.5–5.2)
PROT SERPL-MCNC: 7.9 G/DL (ref 6–8.5)
RBC # BLD AUTO: 4.73 10*6/MM3 (ref 3.77–5.28)
SODIUM SERPL-SCNC: 142 MMOL/L (ref 136–145)
TIBC SERPL-MCNC: 355 MCG/DL (ref 298–536)
TRANSFERRIN SERPL-MCNC: 238 MG/DL (ref 200–360)
WBC # BLD AUTO: 11.88 10*3/MM3 (ref 3.4–10.8)

## 2021-09-16 PROCEDURE — 82728 ASSAY OF FERRITIN: CPT | Performed by: INTERNAL MEDICINE

## 2021-09-16 PROCEDURE — 86803 HEPATITIS C AB TEST: CPT | Performed by: INTERNAL MEDICINE

## 2021-09-16 PROCEDURE — 82746 ASSAY OF FOLIC ACID SERUM: CPT | Performed by: INTERNAL MEDICINE

## 2021-09-16 PROCEDURE — 85060 BLOOD SMEAR INTERPRETATION: CPT

## 2021-09-16 PROCEDURE — 87340 HEPATITIS B SURFACE AG IA: CPT

## 2021-09-16 PROCEDURE — 82607 VITAMIN B-12: CPT | Performed by: INTERNAL MEDICINE

## 2021-09-16 PROCEDURE — 99204 OFFICE O/P NEW MOD 45 MIN: CPT | Performed by: INTERNAL MEDICINE

## 2021-09-16 PROCEDURE — 80053 COMPREHEN METABOLIC PANEL: CPT | Performed by: INTERNAL MEDICINE

## 2021-09-16 PROCEDURE — 84466 ASSAY OF TRANSFERRIN: CPT | Performed by: INTERNAL MEDICINE

## 2021-09-16 PROCEDURE — 83540 ASSAY OF IRON: CPT | Performed by: INTERNAL MEDICINE

## 2021-09-16 PROCEDURE — 36415 COLL VENOUS BLD VENIPUNCTURE: CPT

## 2021-09-16 PROCEDURE — 85025 COMPLETE CBC W/AUTO DIFF WBC: CPT | Performed by: INTERNAL MEDICINE

## 2021-09-16 RX ORDER — DOXYCYCLINE HYCLATE 100 MG
50 TABLET ORAL DAILY
Qty: 30 TABLET | Refills: 1 | Status: SHIPPED | OUTPATIENT
Start: 2021-09-16 | End: 2022-09-15

## 2021-09-16 RX ORDER — BUPROPION HYDROCHLORIDE 150 MG/1
150 TABLET, EXTENDED RELEASE ORAL 2 TIMES DAILY
Qty: 60 TABLET | Refills: 3 | Status: SHIPPED | OUTPATIENT
Start: 2021-09-16 | End: 2022-09-15

## 2021-09-16 NOTE — PROGRESS NOTES
[facility] GROUP HEMATOLOGY AND ONCOLOGY  [address]  [place] [address]  [phone]       09/16/2021     CONSULT NOTE     PATIENT NAME: Yue Jenkins  YOB: 1980  MR: [id number]    REFERRING PROVIDER: Krissy Burr DO  PCP: Krissy Burr DO    REASON FOR REFERRAL: Leukocytosis    HPI:   Ms. Yue Jenkins is a very pleasant 40 y.o. white female referred to us for evaluation of leukocytosis.  On 8/18/2021 she was noted to have WBC 12.9, hemoglobin 14.1 platelets 362 neutrophils 11 lymphocytes 1.5.  The patient complains of chronic pain on the right leg.  She was crying today.  She was stretching her leg because of the pain.  I told the patient that based on the review of the labs and the history it is more likely that she has reactive leukocytosis rather than a primary hematological disorder.  She was reassured.  We discussed that smoking can cause leukocytosis.  She also has severe chronic case of acne.  We discussed smoke cessation with Zyban and she would like to try it.  She would like to try something for the acne so I agreed to prescribe doxycycline since she has used multiple topical treatment for the acne and that has not been successful.  We decided to repeat the CBC today and depending on the findings we will decide whether or not additional hematological evaluation would be indicated.  PAST HISTORY:     HEME/ONC HISTORY  Oncology/Hematology History Overview Note   HEME/ONC DX/RX/INVESTIGATIONS SUMMARY:   DX: Leukocytosis (minimal neutrophilia). Smoker.  RX: Zyban prescribed 9/16/2021.  OTHER INFO: Severe acne  INVESTIGATIONS: 8/18/2021, WBC 12.9 hemoglobin 14.1 MCV 95 platelets 362 neutrophils 11 lymphocytes 1.5       MEDICAL HISTORY   has a past medical history of Anxiety and Depression.   HEALTH MAINTENANCE ITEMS  There are no preventive care reminders to display for this patient.  <no  information>  Last Completed Colonoscopy     This patient has no relevant Health Maintenance data.          There is no immunization history on file for this patient.  Last Completed Mammogram     This patient has no relevant Health Maintenance data.        FAMILY HISTORY  Family History   Problem Relation Age of Onset   • Endometriosis Mother    • No Known Problems Father    • No Known Problems Sister    • No Known Problems Brother    • No Known Problems Paternal Grandmother    • No Known Problems Brother    • Breast cancer Other    • Ovarian cancer Neg Hx    • Uterine cancer Neg Hx    • Colon cancer Neg Hx        Cancer-related family history includes Breast cancer in an other family member. There is no history of Ovarian cancer, Uterine cancer, or Colon cancer.   SURGICAL HISTORY   has a past surgical history that includes Tubal ligation; Dilation and curettage of uterus; and [place] tooth extraction.  SOCIAL HISTORY  Social History     Socioeconomic History   • Marital status:      Spouse name: Not on file   • Number of children: Not on file   • Years of education: Not on file   • Highest education level: Not on file   Tobacco Use   • Smoking status: Current Every Day Smoker     Packs/day: 1.00     Years: 27.00     Pack years: 27.00     Types: Cigarettes   • Smokeless tobacco: Never Used   Vaping Use   • Vaping Use: Never used   Substance and Sexual Activity   • Alcohol use: Not Currently     Comment: occasional wine   • Drug use: No   • Sexual activity: Yes     Partners: Male     MEDICATIONS:     Current Outpatient Medications   Medication Instructions   • buPROPion (ZYBAN) 150 mg, Oral, 2 Times Daily, One tab by mouth daily x 3 days, then one tab by mouth every 12h   • doxycycline (VIBRAMYICN) 50 mg, Oral, Daily   • dronabinol (MARINOL) 5 mg, Oral, 2 Times Daily Before Meals   • gabapentin (NEURONTIN) 300 mg, Oral, 3 Times Daily   • HYDROcodone-acetaminophen (Norco) 7.5-325 MG per tablet 1 tablet, Oral,  "Every 6 Hours PRN      ALLERGIES:   No Known Allergies  ROS:   Pertinent positive and negative findings as noted in HPI.   PHYSICAL EXAM:   /90   Pulse 97   Temp 98.7 °F (37.1 °C) (Temporal)   Resp 18   Ht 165.1 cm (65\")   Wt 44.5 kg (98 lb)   SpO2 97%   BMI 16.31 kg/m²  Body surface area is 1.46 meters squared.   Pain Score    09/16/21 0915   PainSc: 8  Comment: LEG     Alert, oriented x3, cooperative, crying in pain, complaining of right leg pain  HEENT: Normocephalic, wearing a facemask.  Lungs: No tachypnea. Clear bilaterally.  Heart: Regular rate and rhythm.  Extremities: No ankle edema.  Neurologic: Moves all extremities.  Skin: Moderate to severe facial acne.  INVESTIGATIONS/LABS:     Lab Results - Last 18 Months   Lab Units 09/16/21  0956 08/18/21  1113   WBC 10*3/mm3 11.88* 12.9*   HEMOGLOBIN g/dL 14.7 14.1   HEMATOCRIT % 45.4 42.6   MCV fL 96.0 95   PLATELETS 10*3/mm3 359 362   IMM GRAN % % 0.3  --    NEUTROS ABS 10*3/mm3 9.55* 11.0*   LYMPHS ABS 10*3/mm3 1.59 1.5   MONOS ABS 10*3/mm3 0.65 0.4   EOS ABS 10*3/mm3 0.01 0.0   BASOS ABS 10*3/mm3 0.04 0.0   IMMATURE GRANS (ABS) 10*3/mm3 0.04  --    NRBC /100 WBC 0.0  --        Lab Results - Last 18 Months   Lab Units 09/16/21  0956 08/18/21  1113   BUN mg/dL 8 8   CREATININE mg/dL 0.53* 0.67   GLUCOSE mg/dL 98  --    SODIUM mmol/L 142 141   POTASSIUM mmol/L 3.9 4.1   TOTAL CO2 mmol/L  --  24   CO2 mmol/L 26.0  --    CHLORIDE mmol/L 106 102   ANION GAP mmol/L 10.0  --    BUN / CREAT RATIO  15.1 12   CALCIUM mg/dL 9.4 9.3   EGFR IF NONAFRICN AM mL/min/1.73 128 111   ALK PHOS U/L 87 90   TOTAL PROTEIN g/dL 7.9  --    ALT (SGPT) U/L 13 9   AST (SGOT) U/L 14 11   BILIRUBIN mg/dL 0.4 0.3   ALBUMIN g/dL 5.10 4.8   GLOBULIN gm/dL 2.8  --        Lab Results - Last 18 Months   Lab Units 09/16/21  0956 08/18/21  1113   IRON mcg/dL 128  --    TIBC mcg/dL 355  --    IRON SATURATION % 36  --    FERRITIN ng/mL 57.47  --    VITAMIN B 12 pg/mL  --  329   TSH uIU/mL "  --  0.481   T4 TOTAL ug/dL  --  9.8       XR Spine Lumbar 2 or 3 View (In Office)    Result Date: 8/18/2021   1.  Moderate disc space height loss at L5-S1.   This report was finalized on 08/18/2021 13:28 by Dr. Eric Lopez MD.      ASSESSMENT:   Leukocytosis (neutrophilia) undergoing evaluation.  Differential diagnoses include reactive leukocytosis associated with smoking.  She also has severe acne. It is less likely that the leukocytosis in her case is from a myeloproliferative disorder.    PLAN/RECOMMENDATIONS:   Labs today.     The patient will start the Zyban for smoke cessation.  She will also try doxycycline 50 mg daily for acne and it also has anti-inflammatory properties.    VST in one week.     DX/ORDERS:   Diagnoses and all orders for this visit:    1. Leukocytosis, unspecified type (Primary)  -     CBC Auto Differential  -     Comprehensive Metabolic Panel  -     Iron Profile  -     Vitamin B12 & Folate  -     Ferritin  -     Hepatitis B Surface Antigen; Future  -     Hepatitis C Antibody  -     Peripheral Blood Smear; Future    2. Smoker    Other orders  -     buPROPion (ZYBAN) 150 MG 12 hr tablet; Take 150 mg by mouth 2 (Two) Times a Day for 30 days. One tab by mouth daily x 3 days, then one tab by mouth every 12h  Dispense: 60 tablet; Refill: 3  -     doxycycline (VIBRAMYICN) 100 MG tablet; Take 0.5 tablets by mouth Daily.  Dispense: 30 tablet; Refill: 1        Future Appointments   Date Time Provider Department Center   9/20/2021 12:15 PM PAD BIC CT 1 BH PAD CT BI PAD   9/20/2021 12:30 PM PAD [facility] MRI 2 BH PAD MR BI PAD   9/21/2021 10:00 AM Judah Lou MD [facility]   9/28/2021 11:00 AM Krissy Burr DO [facility]        Thank you very much for having referred this very pleasant patient.    Judah Lou MD  9/16/2021    cc: Krissy Burr DO

## 2021-09-16 NOTE — TELEPHONE ENCOUNTER
Pt called stating her leg was still hurting her. She has seen Dr. Burr for this issue. She states this is not a new issue. She states she has already taken all of her pain medication that was prescribed. I advised pt that we do not have any apts today and that she can go to an urgent care or the ER. Pt refused to go to the ER. Stated that she would look into going to an urgent care. Told her to call us back if she needs anything else. Pt voiced understanding.

## 2021-09-17 ENCOUNTER — PATIENT ROUNDING (BHMG ONLY) (OUTPATIENT)
Dept: ONCOLOGY | Facility: CLINIC | Age: 41
End: 2021-09-17

## 2021-09-17 LAB
FOLATE SERPL-MCNC: 10.9 NG/ML (ref 4.78–24.2)
VIT B12 BLD-MCNC: 388 PG/ML (ref 211–946)

## 2021-09-17 NOTE — PROGRESS NOTES
"September 17, 2021    Hello, may I speak with Yue Jenkins?    My name is Laura Valle      I am  with [facility] GROUP HEMATOLOGY AND ONCOLOGY  [address]  [place] [address] [phone].    Before we get started may I verify your date of birth? 1980    I am calling to officially welcome you to our practice and ask about your recent visit. Is this a good time to talk? Yes    Tell me about your visit with us. What things went well?  \"Yes, you ladies are great!\"       We're always looking for ways to make our patients' experiences even better. Do you have recommendations on ways we may improve?  \"Can you help with pain medicine?\" -- Advised the patient I could not since that is not what we see her for and she voiced understanding.     Overall were you satisfied with your first visit to our practice? \"Yes, thank you so much!\"       I appreciate you taking the time to speak with me today. Is there anything else I can do for you? No      Thank you, and have a great day.      "

## 2021-09-20 ENCOUNTER — HOSPITAL ENCOUNTER (OUTPATIENT)
Dept: MRI IMAGING | Facility: HOSPITAL | Age: 41
Discharge: HOME | End: 2021-09-20

## 2021-09-20 ENCOUNTER — HOSPITAL ENCOUNTER (OUTPATIENT)
Dept: CT IMAGING | Facility: HOSPITAL | Age: 41
Discharge: HOME | End: 2021-09-20

## 2021-09-20 DIAGNOSIS — M25.551 RIGHT HIP PAIN: ICD-10-CM

## 2021-09-20 DIAGNOSIS — M54.10 BACK PAIN WITH RADICULOPATHY: ICD-10-CM

## 2021-09-20 PROCEDURE — 73700 CT LOWER EXTREMITY W/O DYE: CPT

## 2021-09-20 PROCEDURE — 72148 MRI LUMBAR SPINE W/O DYE: CPT

## 2021-09-21 ENCOUNTER — TELEPHONE (OUTPATIENT)
Dept: INTERNAL MEDICINE | Facility: CLINIC | Age: 41
End: 2021-09-21

## 2021-09-21 ENCOUNTER — OFFICE VISIT (OUTPATIENT)
Dept: ONCOLOGY | Facility: CLINIC | Age: 41
End: 2021-09-21

## 2021-09-21 ENCOUNTER — OFFICE VISIT (OUTPATIENT)
Dept: INTERNAL MEDICINE | Facility: CLINIC | Age: 41
End: 2021-09-21

## 2021-09-21 VITALS
TEMPERATURE: 99.4 F | WEIGHT: 96.9 LBS | BODY MASS INDEX: 16.14 KG/M2 | OXYGEN SATURATION: 97 % | HEART RATE: 79 BPM | RESPIRATION RATE: 18 BRPM | DIASTOLIC BLOOD PRESSURE: 88 MMHG | SYSTOLIC BLOOD PRESSURE: 122 MMHG | HEIGHT: 65 IN

## 2021-09-21 VITALS
HEART RATE: 92 BPM | BODY MASS INDEX: 16.36 KG/M2 | DIASTOLIC BLOOD PRESSURE: 78 MMHG | WEIGHT: 98.2 LBS | SYSTOLIC BLOOD PRESSURE: 114 MMHG | HEIGHT: 65 IN | TEMPERATURE: 97.3 F | OXYGEN SATURATION: 98 %

## 2021-09-21 DIAGNOSIS — M54.10 BACK PAIN WITH RADICULOPATHY: ICD-10-CM

## 2021-09-21 DIAGNOSIS — M25.551 RIGHT HIP PAIN: Primary | ICD-10-CM

## 2021-09-21 DIAGNOSIS — M54.42 ACUTE LEFT-SIDED LOW BACK PAIN WITH LEFT-SIDED SCIATICA: ICD-10-CM

## 2021-09-21 DIAGNOSIS — D72.829 LEUKOCYTOSIS, UNSPECIFIED TYPE: Primary | ICD-10-CM

## 2021-09-21 PROCEDURE — 99213 OFFICE O/P EST LOW 20 MIN: CPT | Performed by: INTERNAL MEDICINE

## 2021-09-21 PROCEDURE — 99214 OFFICE O/P EST MOD 30 MIN: CPT | Performed by: INTERNAL MEDICINE

## 2021-09-21 RX ORDER — HYDROCODONE BITARTRATE AND ACETAMINOPHEN 7.5; 325 MG/1; MG/1
1 TABLET ORAL EVERY 6 HOURS PRN
Qty: 30 TABLET | Refills: 0 | Status: SHIPPED | OUTPATIENT
Start: 2021-09-21 | End: 2021-09-24 | Stop reason: SDUPTHER

## 2021-09-21 NOTE — PROGRESS NOTES
[facility] GROUP HEMATOLOGY AND ONCOLOGY  [address]  [place] [address]  [phone]       09/21/2021     PROGRESS NOTE     PATIENT NAME: Yue Jenkins  YOB: 1980  40 y.o. MR: [id number]    PCP: Krissy Burr DO    HPI:   Ms. Yue Jenkins return to the office to review the results of the evaluation for leukocytosis.  I told her that the findings are consistent with reactive leukocytosis.  I told her that there is no evidence of a primary hematological diagnosis at this time.  We discussed that she may have an inflammatory response to smoking or it is also possible it may be related to what ever disease process might be causing the pain on the right leg.  Unfortunately she remains with severe pain on the right leg.  She is undergoing evaluation for that by primary care.  She recently had another MRI and CT scan.  The patient started Zyban after the last visit here to try to quit smoking.  I also prescribed doxycycline mostly for acne.  She is tolerating those medications well and may try attempt of smoke cessation soon. I told the patient that she should follow-up with primary care regarding the leukocytosis with routine CBCs and to also decide if she should continue those medications.  The patient was in agreement with my recommendations.  PAST HISTORY:     HEME/ONC HISTORY  Oncology/Hematology History Overview Note   HEME/ONC DX/RX/INVESTIGATIONS SUMMARY:   DX: Leukocytosis (minimal neutrophilia). Smoker.  RX: Zyban prescribed 9/16/2021.  OTHER INFO: Severe acne  INVESTIGATIONS: 8/18/2021, WBC 12.9 hemoglobin 14.1 MCV 95 platelets 362 neutrophils 11 lymphocytes 1.5       MEDICAL HISTORY   has a past medical history of Anxiety and Depression.   HEALTH MAINTENANCE ITEMS  Health Maintenance Due   Topic Date Due   • ANNUAL PHYSICAL  Never done     <no information>  Last Completed Colonoscopy     This patient has no  relevant Health Maintenance data.          There is no immunization history on file for this patient.  Last Completed Mammogram     This patient has no relevant Health Maintenance data.        FAMILY HISTORY  Family History   Problem Relation Age of Onset   • Endometriosis Mother    • No Known Problems Father    • No Known Problems Sister    • No Known Problems Brother    • No Known Problems Paternal Grandmother    • No Known Problems Brother    • Breast cancer Other    • Ovarian cancer Neg Hx    • Uterine cancer Neg Hx    • Colon cancer Neg Hx        Cancer-related family history includes Breast cancer in an other family member. There is no history of Ovarian cancer, Uterine cancer, or Colon cancer.   SURGICAL HISTORY   has a past surgical history that includes Tubal ligation; Dilation and curettage of uterus; and [place] tooth extraction.  SOCIAL HISTORY  Social History     Socioeconomic History   • Marital status:      Spouse name: Not on file   • Number of children: Not on file   • Years of education: Not on file   • Highest education level: Not on file   Tobacco Use   • Smoking status: Current Every Day Smoker     Packs/day: 1.00     Years: 27.00     Pack years: 27.00     Types: Cigarettes   • Smokeless tobacco: Never Used   Vaping Use   • Vaping Use: Never used   Substance and Sexual Activity   • Alcohol use: Not Currently     Comment: occasional wine   • Drug use: No   • Sexual activity: Yes     Partners: Male     MEDICATIONS:     Current Outpatient Medications   Medication Instructions   • buPROPion (ZYBAN) 150 mg, Oral, 2 Times Daily, One tab by mouth daily x 3 days, then one tab by mouth every 12h   • doxycycline (VIBRAMYICN) 50 mg, Oral, Daily   • dronabinol (MARINOL) 5 mg, Oral, 2 Times Daily Before Meals   • gabapentin (NEURONTIN) 300 mg, Oral, 3 Times Daily   • HYDROcodone-acetaminophen (Norco) 7.5-325 MG per tablet 1 tablet, Oral, Every 6 Hours PRN      ALLERGIES:   No Known Allergies  ROS:  "  Pertinent positive and negative findings as noted in HPI.   PHYSICAL EXAM:   /88   Pulse 79   Temp 99.4 °F (37.4 °C)   Resp 18   Ht 165.1 cm (65\")   Wt 44 kg (96 lb 14.4 oz)   SpO2 97%   Breastfeeding No   BMI 16.13 kg/m²  Body surface area is 1.45 meters squared.   Pain Score    09/21/21 0958   PainSc:   6     Alert, oriented x3, cooperative, crying complaining of pain on the right leg.  HEENT: Normocephalic, wearing a facemask.  Lungs: No tachypnea.   Extremities: No ankle edema.  Neurologic: Moves all extremities.  INVESTIGATIONS/LABS:     Lab Results - Last 18 Months   Lab Units 09/16/21  0956 08/18/21  1113   WBC 10*3/mm3 11.88* 12.9*   HEMOGLOBIN g/dL 14.7 14.1   HEMATOCRIT % 45.4 42.6   MCV fL 96.0 95   PLATELETS 10*3/mm3 359 362   IMM GRAN % % 0.3  --    NEUTROS ABS 10*3/mm3 9.55* 11.0*   LYMPHS ABS 10*3/mm3 1.59 1.5   MONOS ABS 10*3/mm3 0.65 0.4   EOS ABS 10*3/mm3 0.01 0.0   BASOS ABS 10*3/mm3 0.04 0.0   IMMATURE GRANS (ABS) 10*3/mm3 0.04  --    NRBC /100 WBC 0.0  --        Lab Results - Last 18 Months   Lab Units 09/16/21  0956 08/18/21  1113   BUN mg/dL 8 8   CREATININE mg/dL 0.53* 0.67   GLUCOSE mg/dL 98  --    SODIUM mmol/L 142 141   POTASSIUM mmol/L 3.9 4.1   TOTAL CO2 mmol/L  --  24   CO2 mmol/L 26.0  --    CHLORIDE mmol/L 106 102   ANION GAP mmol/L 10.0  --    BUN / CREAT RATIO  15.1 12   CALCIUM mg/dL 9.4 9.3   EGFR IF NONAFRICN AM mL/min/1.73 128 111   ALK PHOS U/L 87 90   TOTAL PROTEIN g/dL 7.9  --    ALT (SGPT) U/L 13 9   AST (SGOT) U/L 14 11   BILIRUBIN mg/dL 0.4 0.3   ALBUMIN g/dL 5.10 4.8   GLOBULIN gm/dL 2.8  --        Lab Results - Last 18 Months   Lab Units 09/16/21  0956 08/18/21  1113   IRON mcg/dL 128  --    TIBC mcg/dL 355  --    IRON SATURATION % 36  --    FERRITIN ng/mL 57.47  --    VITAMIN B 12 pg/mL 388 329   FOLATE ng/mL 10.90  --    TSH uIU/mL  --  0.481   T4 TOTAL ug/dL  --  9.8       XR Spine Lumbar 2 or 3 View (In Office)    Result Date: 8/18/2021   1.  Moderate " disc space height loss at L5-S1.   This report was finalized on 08/18/2021 13:28 by Dr. Eric Lopez MD.    MRI Lumbar Spine Without Contrast    Result Date: 9/20/2021   1.  Degenerative disc disease at L5-S1 with disc space height loss and a prominent disc protrusion on the LEFT. Possible contact of the descending LEFT S1 nerve root and the exiting LEFT L5 nerve root. Moderate LEFT foraminal narrowing and mild thecal sac narrowing at this level.  2.  Mild Modic 1 endplate changes at L5-S1 posteriorly   This report was finalized on 09/20/2021 14:57 by Dr. Eric Lopez MD.    CT Lower Extremity Right Without Contrast    Result Date: 9/20/2021   1.  No acute osseous abnormality in the RIGHT hip.  2.  Enthesopathy versus what is likely a chronic avulsion injury at the rectus femoris origin from the anterior inferior iliac spine. If this localizes to the patient's location of pain, MRI of the RIGHT hip or pelvis could be performed for further characterization. My suspicion is that this is old.  3.  Mild degenerative change at the pubic symphysis. This report was finalized on 09/20/2021 14:52 by Dr. Eric Lopez MD.      ASSESSMENT:   Reactive leukocytosis, predominantly minimal, nonprogressive, stable neutrophilia.  She is a smoker and that is associated with leukocytosis.  She may have an inflammatory associated with right leg pain and undergoing evaluation.    PLAN/RECOMMENDATIONS:   Follow-up with primary care regarding the leukocytosis.  If the patient develops additional hematological abnormalities or if the leukocytosis progresses and persists above 20,000 without identifiable etiology, please do not hesitate to refer the patient back.    Thank you very much for having referred this very pleasant patient.    VST PRN.     DX/ORDERS:   Diagnoses and all orders for this visit:    1. Leukocytosis, unspecified type (Primary)        Future Appointments   Date Time Provider Department Center   9/21/2021  2:45 PM  Krissy Burr DO [facility]   9/28/2021 11:00 AM Krissy Burr DO [facility]        Judah Lou MD  9/21/2021    cc: Krissy Burr DO

## 2021-09-21 NOTE — TELEPHONE ENCOUNTER
Caller: Yue Jenkins S    Relationship: Self    Best call back number:     [phone]      What is the best time to reach you:    ANYTIME    Who are you requesting to speak with     CLINICAL STAFF      Do you know the name of the person who called:     PATIENT    What was the call regarding:    ASKED IF DR LUCAS TO PLEASE GIVE HER A CALL. I MADE AN APPOINTMENT FOR HER TODAY     Do you require a callback:     PLEASE

## 2021-09-21 NOTE — PROGRESS NOTES
Subjective     Chief Complaint   Patient presents with   • Leg Pain   • Hip Pain       History of Present Illness  Patient is having pain at the location of the anterior inferior iliac spine on the right.   Unable to perform ADLS.   Ongoing. Tearful in the office.     Reviewed MRI and CT with the patient. Gave her a copy.     Patient's PMR from outside medical facility reviewed and noted.    Review of Systems   Constitutional: Negative for chills and fever.   HENT: Negative for congestion and rhinorrhea.    Respiratory: Negative for cough and shortness of breath.    Cardiovascular: Negative for chest pain and leg swelling.   Gastrointestinal: Negative for constipation and diarrhea.   Musculoskeletal: Positive for arthralgias, back pain and gait problem.        Right hip pain        Otherwise complete ROS reviewed and negative except as mentioned in the HPI.    Past Medical History:   Past Medical History:   Diagnosis Date   • Anxiety    • Depression      Past Surgical History:  Past Surgical History:   Procedure Laterality Date   • DILATATION AND CURETTAGE     • TUBAL ABDOMINAL LIGATION     • WISDOM TOOTH EXTRACTION       Social History:  reports that she has been smoking cigarettes. She has a 27.00 pack-year smoking history. She has never used smokeless tobacco. She reports previous alcohol use. She reports that she does not use drugs.    Family History: family history includes Breast cancer in an other family member; Endometriosis in her mother; No Known Problems in her brother, brother, father, paternal grandmother, and sister.       Allergies:  No Known Allergies  Medications:  Prior to Admission medications    Medication Sig Start Date End Date Taking? Authorizing Provider   buPROPion (ZYBAN) 150 MG 12 hr tablet Take 150 mg by mouth 2 (Two) Times a Day for 30 days. One tab by mouth daily x 3 days, then one tab by mouth every 12h 9/16/21 10/16/21 Yes Judah Lou MD   doxycycline (VIBRAMYICN) 100 MG  "tablet Take 0.5 tablets by mouth Daily. 9/16/21  Yes Judah Lou MD   dronabinol (Marinol) 5 MG capsule Take 1 capsule by mouth 2 (Two) Times a Day Before Meals. 8/18/21  Yes Krissy Burr DO   gabapentin (Neurontin) 300 MG capsule Take 1 capsule by mouth 3 (Three) Times a Day. 8/18/21  Yes Krissy Burr DO   HYDROcodone-acetaminophen (Norco) 7.5-325 MG per tablet Take 1 tablet by mouth Every 6 (Six) Hours As Needed for Moderate Pain . 8/31/21  Yes Krissy Burr DO       Objective     Vital Signs: /78 (BP Location: Left arm, Patient Position: Sitting, Cuff Size: Small Adult)   Pulse 92   Temp 97.3 °F (36.3 °C) (Infrared)   Ht 165.1 cm (65\")   Wt 44.5 kg (98 lb 3.2 oz)   SpO2 98%   Breastfeeding No   BMI 16.34 kg/m²   Physical Exam  Vitals reviewed.   Constitutional:       Comments: Tearful and upset.    HENT:      Head: Normocephalic and atraumatic.      Nose: Nose normal.   Eyes:      General: No scleral icterus.     Conjunctiva/sclera: Conjunctivae normal.   Cardiovascular:      Rate and Rhythm: Normal rate and regular rhythm.   Pulmonary:      Effort: Pulmonary effort is normal. No respiratory distress.   Musculoskeletal:         General: No swelling or tenderness.      Cervical back: Normal range of motion and neck supple.      Comments: Pain at the lateral hip area.    Skin:     General: Skin is warm and dry.   Neurological:      General: No focal deficit present.      Mental Status: She is alert.      Cranial Nerves: No cranial nerve deficit.   Psychiatric:         Mood and Affect: Mood normal.         Behavior: Behavior normal.       Patient's Body mass index is 16.34 kg/m². indicating that she is underweight (BMI < 18.5). Recommendations include: treating the underlying disease process.      Results Reviewed:  Glucose   Date Value Ref Range Status   09/16/2021 98 65 - 99 mg/dL Final     BUN   Date Value Ref Range Status   09/16/2021 8 6 - 20 mg/dL Final     Creatinine   Date " Value Ref Range Status   09/16/2021 0.53 (L) 0.57 - 1.00 mg/dL Final     Sodium   Date Value Ref Range Status   09/16/2021 142 136 - 145 mmol/L Final     Potassium   Date Value Ref Range Status   09/16/2021 3.9 3.5 - 5.2 mmol/L Final     Chloride   Date Value Ref Range Status   09/16/2021 106 98 - 107 mmol/L Final     CO2   Date Value Ref Range Status   09/16/2021 26.0 22.0 - 29.0 mmol/L Final     Calcium   Date Value Ref Range Status   09/16/2021 9.4 8.6 - 10.5 mg/dL Final     ALT (SGPT)   Date Value Ref Range Status   09/16/2021 13 1 - 33 U/L Final     AST (SGOT)   Date Value Ref Range Status   09/16/2021 14 1 - 32 U/L Final     WBC   Date Value Ref Range Status   09/16/2021 11.88 (H) 3.40 - 10.80 10*3/mm3 Final   08/18/2021 12.9 (H) 3.4 - 10.8 x10E3/uL Final     Hematocrit   Date Value Ref Range Status   09/16/2021 45.4 34.0 - 46.6 % Final     Platelets   Date Value Ref Range Status   09/16/2021 359 140 - 450 10*3/mm3 Final     Total Cholesterol   Date Value Ref Range Status   03/29/2019 165 130 - 200 mg/dL Final     Triglycerides   Date Value Ref Range Status   03/29/2019 80 0 - 149 mg/dL Final     HDL Cholesterol   Date Value Ref Range Status   03/29/2019 53 >=50 mg/dL Final     LDL Cholesterol    Date Value Ref Range Status   03/29/2019 99 0 - 99 mg/dL Final     LDL/HDL Ratio   Date Value Ref Range Status   03/29/2019 1.81  Final         Assessment / Plan     Assessment/Plan:  1. Right hip pain  - Ambulatory Referral to Orthopedic Surgery  - MRI Hip Right Without Contrast; Future  - HYDROcodone-acetaminophen (Norco) 7.5-325 MG per tablet; Take 1 tablet by mouth Every 6 (Six) Hours As Needed for Moderate Pain .  Dispense: 30 tablet; Refill: 0    2. Acute left-sided low back pain with left-sided sciatica  - Ambulatory Referral to Orthopedic Surgery    3. Back pain with radiculopathy  - HYDROcodone-acetaminophen (Norco) 7.5-325 MG per tablet; Take 1 tablet by mouth Every 6 (Six) Hours As Needed for Moderate Pain  .  Dispense: 30 tablet; Refill: 0        No follow-ups on file. unless patient needs to be seen sooner or acute issues arise.    Code Status: Full    I have discussed the patient results/orders and and plan/recommendation with them at today's visit.      Krissy Burr, DO   09/21/2021

## 2021-09-24 DIAGNOSIS — M25.551 RIGHT HIP PAIN: ICD-10-CM

## 2021-09-24 DIAGNOSIS — M54.10 BACK PAIN WITH RADICULOPATHY: ICD-10-CM

## 2021-09-24 RX ORDER — HYDROCODONE BITARTRATE AND ACETAMINOPHEN 7.5; 325 MG/1; MG/1
1 TABLET ORAL EVERY 6 HOURS PRN
Qty: 12 TABLET | Refills: 0 | Status: SHIPPED | OUTPATIENT
Start: 2021-09-24 | End: 2021-10-01 | Stop reason: SDUPTHER

## 2021-09-24 NOTE — TELEPHONE ENCOUNTER
Called patient back. She states she is unsure about taking the steroids without talking to Dr. Burr, so she is agreeable to take the 3 days of pain meds, and will follow up with Dr. Burr when she is back in the office next week.

## 2021-09-24 NOTE — TELEPHONE ENCOUNTER
"Called patient. She states that she is in a lot of pain. She began to cry and said that she was unable to sleep due to the pain she was in. Dr. Burr called her in a 7 day supply of Norco. She has a follow up appt. Scheduled 09/28/2021. She was last seen in the office on 09/21/2021 for acute right hip and sciatica pain. The patient states she hasn't been taking the medication at night because she is trying to \"stretch them out\" to last longer. Please review. I advised that Dr. Burr was out of the office and she may have to wait until she is back . She understood. "

## 2021-10-01 DIAGNOSIS — M25.551 RIGHT HIP PAIN: ICD-10-CM

## 2021-10-01 DIAGNOSIS — M54.10 BACK PAIN WITH RADICULOPATHY: ICD-10-CM

## 2021-10-01 RX ORDER — HYDROCODONE BITARTRATE AND ACETAMINOPHEN 7.5; 325 MG/1; MG/1
1 TABLET ORAL EVERY 6 HOURS PRN
Qty: 12 TABLET | Refills: 0 | Status: SHIPPED | OUTPATIENT
Start: 2021-10-01 | End: 2021-10-12

## 2021-10-01 NOTE — TELEPHONE ENCOUNTER
Caller: Yue Jenkins    Relationship: Self      Medication requested (name and dosage): HYDROcodone-acetaminophen (Norco) 7.5-325 MG per tablet     Pharmacy where request should be sent:  [facility]/pharmacy #[id number] - [place], [place] - [address] AT [address]OSS [address] - [phone]  - [phone]      Additional details provided by patient: Pt stated that she went to ortho yesterday and they did not prescribe any pain medication - she said that she is having a hard time doing everyday activities with her kids due to her legs and hip hurting - she said that she is not able to eat without the medication due to being in so much pain but when she is taking the pain med she is able to eat     Best call back number:   [phone]      Does the patient have less than a 3 day supply:  [x] Yes  [] No    Keagan Gutierrez Rep   10/01/21 09:48 CDT

## 2021-10-01 NOTE — TELEPHONE ENCOUNTER
Rx Refill Note  Requested Prescriptions     Pending Prescriptions Disp Refills   • HYDROcodone-acetaminophen (Norco) 7.5-325 MG per tablet 12 tablet 0     Sig: Take 1 tablet by mouth Every 6 (Six) Hours As Needed for Moderate Pain .     Med last filled: 9/24/21    Last office visit with prescribing clinician: 9/21/2021      Next office visit with prescribing clinician: 10/19/2021     Compliance Drug Analysis, Ur - Urine, Clean Catch (08/31/2021 10:06)    Do you want to refill this medication for this patient?              Lynn Leo RN  10/01/21, 09:54 CDT

## 2021-10-06 ENCOUNTER — NURSE TRIAGE (OUTPATIENT)
Dept: CALL CENTER | Facility: HOSPITAL | Age: 41
End: 2021-10-06

## 2021-10-06 NOTE — TELEPHONE ENCOUNTER
Pt received norco 7.5mg #12 on 10/1. Asking for refill.  Having severe right hip/leg pain.  Has appt with ortho pain management for injection on 10/11. Seeing Dr Burr on 10/12. MRI tomorrow.  Will be difficult to lay still for procedure as much pain as she is in, if physician will consider additional medication to cover until her appt.      Reason for Disposition  • Prescription refill request for a CONTROLLED substance (e.g., narcotics, ADHD medicines)    Additional Information  • Negative: [1] Intentional drug overdose AND [2] suicidal thoughts or ideas  • Negative: Drug overdose and triager unable to answer question  • Negative: Caller requesting information unrelated to medicine  • Negative: Caller requesting information about COVID-19 Vaccine  • Negative: Caller requesting information about Emergency Contraception  • Negative: Caller requesting information about Combined Birth Control Pills  • Negative: Caller requesting information about Progestin Birth Control Pills  • Negative: Caller requesting information about Post-Op pain or medicines  • Negative: Caller requesting a prescription antibiotic (such as Penicillin) for Strep throat and has a positive culture result  • Negative: Caller requesting a prescription anti-viral med (such as Tamiflu) and has influenza (flu)  symptoms  • Negative: Immunization reaction suspected  • Negative: Rash while taking a medicine or within 3 days of stopping it  • Negative: [1] Asthma and [2] having symptoms of asthma (cough, wheezing, etc.)  • Negative: [1] Symptom of illness (e.g., headache, abdominal pain, earache, vomiting) AND [2] more than mild  • Negative: Breastfeeding questions about mother's medicines and diet  • Negative: MORE THAN A DOUBLE DOSE of a prescription or over-the-counter (OTC) drug  • Negative: [1] DOUBLE DOSE (an extra dose or lesser amount) of prescription drug AND [2] any symptoms (e.g., dizziness, nausea, pain, sleepiness)  • Negative: [1] DOUBLE  "DOSE (an extra dose or lesser amount) of over-the-counter (OTC) drug AND [2] any symptoms (e.g., dizziness, nausea, pain, sleepiness)  • Negative: Took another person's prescription drug  • Negative: [1] DOUBLE DOSE (an extra dose or lesser amount) of prescription drug AND [2] NO symptoms (Exception: a double dose of antibiotics)  • Negative: Diabetes drug error or overdose (e.g., took wrong type of insulin or took extra dose)  • Negative: [1] Prescription refill request for ESSENTIAL medicine (i.e., likelihood of harm to patient if not taken) AND [2] triager unable to refill per department policy  • Negative: [1] Prescription not at pharmacy AND [2] was prescribed by PCP recently (Exception: triager has access to EMR and prescription is recorded there. Go to Home Care and confirm for pharmacy.)  • Negative: [1] Pharmacy calling with prescription question AND [2] triager unable to answer question  • Negative: [1] Caller has URGENT medicine question about med that PCP or specialist prescribed AND [2] triager unable to answer question  • Negative: Medicine patch causing local rash or itching  • Negative: [1] Caller has medicine question about med NOT prescribed by PCP AND [2] triager unable to answer question (e.g., compatibility with other med, storage)  • Negative: Prescription request for new medicine (not a refill)    Answer Assessment - Initial Assessment Questions  1. NAME of MEDICATION: \"What medicine are you calling about?\"      norco 7.5 mg  2. QUESTION: \"What is your question?\" (e.g., medication refill, side effect)      refill  3. PRESCRIBING HCP: \"Who prescribed it?\" Reason: if prescribed by specialist, call should be referred to that group.  Krissy Burr  4. SYMPTOMS: \"Do you have any symptoms?\"      Rt hip and leg pain  5. SEVERITY: If symptoms are present, ask \"Are they mild, moderate or severe?\"      Crying, severe pain  6. PREGNANCY:  \"Is there any chance that you are pregnant?\" \"When was your last " "menstrual period?\"  no    Protocols used: MEDICATION QUESTION CALL-ADULT-      "

## 2021-10-07 ENCOUNTER — HOSPITAL ENCOUNTER (OUTPATIENT)
Dept: MRI IMAGING | Facility: HOSPITAL | Age: 41
Discharge: HOME | End: 2021-10-07
Admitting: INTERNAL MEDICINE

## 2021-10-07 DIAGNOSIS — M25.551 RIGHT HIP PAIN: ICD-10-CM

## 2021-10-07 PROCEDURE — 73721 MRI JNT OF LWR EXTRE W/O DYE: CPT

## 2021-10-07 NOTE — TELEPHONE ENCOUNTER
Patient notified via voicemail that no meds will be refilled and she should follow up with Dr. Burr.

## 2021-10-12 ENCOUNTER — OFFICE VISIT (OUTPATIENT)
Dept: INTERNAL MEDICINE | Facility: CLINIC | Age: 41
End: 2021-10-12

## 2021-10-12 VITALS
HEART RATE: 116 BPM | OXYGEN SATURATION: 94 % | WEIGHT: 97 LBS | TEMPERATURE: 97.3 F | HEIGHT: 65 IN | SYSTOLIC BLOOD PRESSURE: 133 MMHG | BODY MASS INDEX: 16.16 KG/M2 | DIASTOLIC BLOOD PRESSURE: 89 MMHG

## 2021-10-12 DIAGNOSIS — M54.10 BACK PAIN WITH RADICULOPATHY: ICD-10-CM

## 2021-10-12 DIAGNOSIS — M25.551 RIGHT HIP PAIN: ICD-10-CM

## 2021-10-12 PROCEDURE — 99213 OFFICE O/P EST LOW 20 MIN: CPT | Performed by: INTERNAL MEDICINE

## 2021-10-12 RX ORDER — HYDROCODONE BITARTRATE AND ACETAMINOPHEN 7.5; 325 MG/1; MG/1
1 TABLET ORAL EVERY 8 HOURS PRN
Qty: 30 TABLET | Refills: 0 | Status: SHIPPED | OUTPATIENT
Start: 2021-10-12 | End: 2022-09-15

## 2021-10-12 NOTE — PROGRESS NOTES
Subjective     Chief Complaint   Patient presents with   • Hip Pain     did go to pain mgmt, pain not any better       History of Present Illness  Right hip pain.   Has seen pain management and got an injection yesterday.   Feels like she is in so much pain.   States that pain management gave her steroids and she didn't get any relief. Pain medication does help her to take the edge off to take a shower and take care of her children and get some sleep.     Starts PT and they want to do pool therapy weekly. [facility].     Has an appointment with Dr. Baker in November.     Patient's PMR from outside medical facility reviewed and noted.    Review of Systems   Constitutional: Negative for chills and fever.   HENT: Negative for congestion and rhinorrhea.    Respiratory: Negative for cough and shortness of breath.    Gastrointestinal: Negative for constipation and diarrhea.   Genitourinary: Negative for dysuria and hematuria.   Musculoskeletal: Positive for arthralgias and gait problem.        Ongoing right leg pain.    Psychiatric/Behavioral: Positive for dysphoric mood and sleep disturbance.      Otherwise complete ROS reviewed and negative except as mentioned in the HPI.    Past Medical History:   Past Medical History:   Diagnosis Date   • Anxiety    • Depression      Past Surgical History:  Past Surgical History:   Procedure Laterality Date   • DILATATION AND CURETTAGE     • TUBAL ABDOMINAL LIGATION     • WISDOM TOOTH EXTRACTION       Social History:  reports that she has been smoking cigarettes. She has a 27.00 pack-year smoking history. She has never used smokeless tobacco. She reports previous alcohol use. She reports that she does not use drugs.    Family History: family history includes Breast cancer in an other family member; Endometriosis in her mother; No Known Problems in her brother, brother, father, paternal grandmother, and sister.      Allergies:  No Known Allergies  Medications:  Prior to Admission  "medications    Medication Sig Start Date End Date Taking? Authorizing Provider   buPROPion (ZYBAN) 150 MG 12 hr tablet Take 150 mg by mouth 2 (Two) Times a Day for 30 days. One tab by mouth daily x 3 days, then one tab by mouth every 12h 9/16/21 10/16/21 Yes Judah Lou MD   doxycycline (VIBRAMYICN) 100 MG tablet Take 0.5 tablets by mouth Daily. 9/16/21  Yes Judah Lou MD   dronabinol (Marinol) 5 MG capsule Take 1 capsule by mouth 2 (Two) Times a Day Before Meals. 8/18/21  Yes Krissy Burr DO   gabapentin (Neurontin) 300 MG capsule Take 1 capsule by mouth 3 (Three) Times a Day. 8/18/21 10/12/21  Krissy Burr DO   HYDROcodone-acetaminophen (Norco) 7.5-325 MG per tablet Take 1 tablet by mouth Every 6 (Six) Hours As Needed for Moderate Pain . 10/1/21 10/12/21  Marcela Murrell APRN       Objective     Vital Signs: /89 (BP Location: Left arm, Patient Position: Sitting, Cuff Size: Small Adult)   Pulse 116   Temp 97.3 °F (36.3 °C) (Infrared)   Ht 165.1 cm (65\")   Wt 44 kg (97 lb)   SpO2 94%   Breastfeeding No   BMI 16.14 kg/m²   Physical Exam  Vitals reviewed.   Constitutional:       Appearance: Normal appearance.   HENT:      Head: Normocephalic and atraumatic.      Nose: Nose normal.   Eyes:      General: No scleral icterus.     Conjunctiva/sclera: Conjunctivae normal.   Cardiovascular:      Rate and Rhythm: Normal rate and regular rhythm.   Pulmonary:      Effort: Pulmonary effort is normal. No respiratory distress.   Musculoskeletal:         General: Tenderness present.      Cervical back: Normal range of motion and neck supple.      Comments: Decreased ROM of the left hip area. Antalgic gait. Sitting on pillow in exam room.    Skin:     General: Skin is warm and dry.   Neurological:      Mental Status: She is alert.      Cranial Nerves: No cranial nerve deficit.   Psychiatric:         Behavior: Behavior normal.      Comments: Tearful and upset during visit with poor eye " contact.        Patient's Body mass index is 16.14 kg/m². indicating that she is underweight (BMI < 18.5). Recommendations include: treating the underlying disease process.      Results Reviewed:  Glucose   Date Value Ref Range Status   09/16/2021 98 65 - 99 mg/dL Final     BUN   Date Value Ref Range Status   09/16/2021 8 6 - 20 mg/dL Final     Creatinine   Date Value Ref Range Status   09/16/2021 0.53 (L) 0.57 - 1.00 mg/dL Final     Sodium   Date Value Ref Range Status   09/16/2021 142 136 - 145 mmol/L Final     Potassium   Date Value Ref Range Status   09/16/2021 3.9 3.5 - 5.2 mmol/L Final     Chloride   Date Value Ref Range Status   09/16/2021 106 98 - 107 mmol/L Final     CO2   Date Value Ref Range Status   09/16/2021 26.0 22.0 - 29.0 mmol/L Final     Calcium   Date Value Ref Range Status   09/16/2021 9.4 8.6 - 10.5 mg/dL Final     ALT (SGPT)   Date Value Ref Range Status   09/16/2021 13 1 - 33 U/L Final     AST (SGOT)   Date Value Ref Range Status   09/16/2021 14 1 - 32 U/L Final     WBC   Date Value Ref Range Status   09/16/2021 11.88 (H) 3.40 - 10.80 10*3/mm3 Final   08/18/2021 12.9 (H) 3.4 - 10.8 x10E3/uL Final     Hematocrit   Date Value Ref Range Status   09/16/2021 45.4 34.0 - 46.6 % Final     Platelets   Date Value Ref Range Status   09/16/2021 359 140 - 450 10*3/mm3 Final     Total Cholesterol   Date Value Ref Range Status   03/29/2019 165 130 - 200 mg/dL Final     Triglycerides   Date Value Ref Range Status   03/29/2019 80 0 - 149 mg/dL Final     HDL Cholesterol   Date Value Ref Range Status   03/29/2019 53 >=50 mg/dL Final     LDL Cholesterol    Date Value Ref Range Status   03/29/2019 99 0 - 99 mg/dL Final     LDL/HDL Ratio   Date Value Ref Range Status   03/29/2019 1.81  Final         Assessment / Plan     Assessment/Plan:  1. Right hip pain  - HYDROcodone-acetaminophen (Norco) 7.5-325 MG per tablet; Take 1 tablet by mouth Every 8 (Eight) Hours As Needed for Moderate Pain .  Dispense: 30 tablet;  Refill: 0    2. Back pain with radiculopathy  - Patient seeing pain management and has had injection  - Has Fu appointment with ortho in November.         Return in about 3 months (around 1/12/2022) for Recheck, Next scheduled follow up. unless patient needs to be seen sooner or acute issues arise.    Code Status: Full    I have discussed the patient results/orders and and plan/recommendation with them at today's visit.      Krissy Burr, DO   10/12/2021

## 2021-11-04 ENCOUNTER — TELEPHONE (OUTPATIENT)
Dept: INTERNAL MEDICINE | Facility: CLINIC | Age: 41
End: 2021-11-04

## 2021-11-04 NOTE — TELEPHONE ENCOUNTER
Patient called wanting to discuss dismissal letter    Explained to patient that the reason for dismissal was due to breech of controlled substance contract and taking pain medications more frequently than directed by Dr. Burr. Due to this Dr. Burr felt it best to dismiss patient/provider relationship.       Patient asked to be sent to a different pain management physician - changed referral to [facility] in [place], [place]. They will contact her with appt.       Encouraged patient to find new PCP.

## 2022-01-12 ENCOUNTER — TRANSCRIBE ORDERS (OUTPATIENT)
Dept: ADMINISTRATIVE | Facility: HOSPITAL | Age: 42
End: 2022-01-12

## 2022-01-12 DIAGNOSIS — Z12.31 SCREENING MAMMOGRAM, ENCOUNTER FOR: Primary | ICD-10-CM

## 2022-01-26 ENCOUNTER — APPOINTMENT (OUTPATIENT)
Dept: MAMMOGRAPHY | Facility: HOSPITAL | Age: 42
End: 2022-01-26

## 2022-02-15 ENCOUNTER — HOSPITAL ENCOUNTER (OUTPATIENT)
Dept: MAMMOGRAPHY | Facility: HOSPITAL | Age: 42
Discharge: HOME | End: 2022-02-15

## 2022-02-15 DIAGNOSIS — Z12.31 SCREENING MAMMOGRAM, ENCOUNTER FOR: ICD-10-CM

## 2022-02-15 DIAGNOSIS — N63.20 LEFT BREAST LUMP: ICD-10-CM

## 2022-02-18 ENCOUNTER — HOSPITAL ENCOUNTER (OUTPATIENT)
Dept: ULTRASOUND IMAGING | Facility: HOSPITAL | Age: 42
Discharge: HOME | End: 2022-02-18

## 2022-02-18 ENCOUNTER — HOSPITAL ENCOUNTER (OUTPATIENT)
Dept: MAMMOGRAPHY | Facility: HOSPITAL | Age: 42
Discharge: HOME | End: 2022-02-18

## 2022-02-18 DIAGNOSIS — N63.20 LEFT BREAST LUMP: ICD-10-CM

## 2022-02-18 PROCEDURE — 76642 ULTRASOUND BREAST LIMITED: CPT

## 2022-02-18 PROCEDURE — 77066 DX MAMMO INCL CAD BI: CPT

## 2022-02-18 PROCEDURE — G0279 TOMOSYNTHESIS, MAMMO: HCPCS

## 2022-09-15 ENCOUNTER — HOSPITAL ENCOUNTER (OUTPATIENT)
Dept: GENERAL RADIOLOGY | Facility: HOSPITAL | Age: 42
Discharge: HOME | End: 2022-09-15
Admitting: NURSE PRACTITIONER

## 2022-09-15 PROCEDURE — 71046 X-RAY EXAM CHEST 2 VIEWS: CPT

## 2022-09-15 PROCEDURE — 87081 CULTURE SCREEN ONLY: CPT | Performed by: NURSE PRACTITIONER

## 2022-09-15 PROCEDURE — 87635 SARS-COV-2 COVID-19 AMP PRB: CPT | Performed by: NURSE PRACTITIONER

## 2023-06-26 NOTE — PROGRESS NOTES
CC: establish care - jaw pain    History:  Yue Jenkins is a 38 y.o. female who presents today for evaluation of the above problems.    Left ear and tooth pain that has been on going for several months.  Has been to dentist and ENT.  Dental exam was fine.  CT two days ago and this fine. Has taken amoxicillin and augmentin.  Has done steroid taper pack and this did not help either.    woke her up last night because she was grinding her teeth so loudly it was waking him up.   Has not noticed any popping in his jaw.   Does have issues with depression. Feels that she doesn't want to get out of bed and is very emotional.  Has had thoughts of SI, however, has not had any plans of harming herself.     ROS:  Review of Systems   HENT: Positive for dental problem (Tooth pain - grinding teeth) and ear pain.    Respiratory: Negative for cough and shortness of breath.    Cardiovascular: Negative for chest pain.   Gastrointestinal: Negative for constipation, diarrhea, nausea and vomiting.   Neurological: Positive for headaches (occasional). Negative for dizziness, seizures and light-headedness.   Psychiatric/Behavioral: Positive for dysphoric mood and suicidal ideas. The patient is nervous/anxious.        No Known Allergies  Past Medical History:   Diagnosis Date   • Anxiety    • Depression      Past Surgical History:   Procedure Laterality Date   • DILATATION AND CURETTAGE     • TUBAL ABDOMINAL LIGATION     • WISDOM TOOTH EXTRACTION       Family History   Problem Relation Age of Onset   • No Known Problems Mother    • No Known Problems Father    • No Known Problems Sister    • No Known Problems Brother    • No Known Problems Paternal Grandmother    • No Known Problems Brother    • Breast cancer Other    • Ovarian cancer Neg Hx    • Uterine cancer Neg Hx    • Colon cancer Neg Hx       reports that she has been smoking cigarettes.  She has been smoking about 1.00 pack per day. She has never used smokeless tobacco. She  "reports that she drinks alcohol. She reports that she does not use drugs.      Current Outpatient Medications:   •  naproxen sodium (ALEVE) 220 MG tablet, Take 220 mg by mouth 2 (Two) Times a Day As Needed., Disp: , Rfl:   •  cyclobenzaprine (FLEXERIL) 5 MG tablet, Take 1 tablet by mouth Every Night., Disp: 30 tablet, Rfl: 0  •  indomethacin (INDOCIN) 50 MG capsule, Take 1 capsule by mouth 3 (Three) Times a Day With Meals., Disp: 60 capsule, Rfl: 0  •  venlafaxine XR (EFFEXOR-XR) 37.5 MG 24 hr capsule, Take 1 capsule by mouth Daily., Disp: 7 capsule, Rfl: 0  •  venlafaxine XR (EFFEXOR-XR) 75 MG 24 hr capsule, Take 1 capsule by mouth Daily., Disp: 30 capsule, Rfl: 0    OBJECTIVE:  /74 (BP Location: Left arm, Patient Position: Sitting, Cuff Size: Adult)   Pulse 84   Temp 98.7 °F (37.1 °C) (Temporal)   Resp 12   Ht 170.2 cm (67\")   Wt 47.8 kg (105 lb 6 oz)   LMP 03/07/2019 (Exact Date)   SpO2 99%   BMI 16.50 kg/m²    Physical Exam   Constitutional: She is oriented to person, place, and time. Vital signs are normal. She appears well-developed and well-nourished.   HENT:   Right Ear: Tympanic membrane, external ear and ear canal normal.   Left Ear: Tympanic membrane, external ear and ear canal normal.   Mouth/Throat: Oropharynx is clear and moist. No oral lesions. Normal dentition. No dental abscesses or dental caries.   No popping palpated in jaw.   Cardiovascular: Normal rate and regular rhythm.   Pulmonary/Chest: Effort normal and breath sounds normal.   Abdominal: Soft. Bowel sounds are normal.   Neurological: She is alert and oriented to person, place, and time.   Psychiatric: She has a normal mood and affect. Her behavior is normal.   Vitals reviewed.      Assessment/Plan    Yue was seen today for establish care, earache and jaw pain.    Diagnoses and all orders for this visit:    Jaw pain  -     CBC (No Diff); Future  -     Comprehensive metabolic panel; Future  -     indomethacin (INDOCIN) 50 MG " capsule; Take 1 capsule by mouth 3 (Three) Times a Day With Meals.  -     cyclobenzaprine (FLEXERIL) 5 MG tablet; Take 1 tablet by mouth Every Night.  Suspect pain related to tooth grinding vs TMJ.  No popping felt in jaw so likely grinding.  Will treat anxiety and depression, which would contribute to this and also order a muscle relaxer to take at night as well as anti-inflammatory medication for comfort. Will follow up in two weeks.     Anxiety and depression  -     CBC (No Diff); Future  -     Comprehensive metabolic panel; Future  -     venlafaxine XR (EFFEXOR-XR) 75 MG 24 hr capsule; Take 1 capsule by mouth Daily.  -     venlafaxine XR (EFFEXOR-XR) 37.5 MG 24 hr capsule; Take 1 capsule by mouth Daily.  Discussed that at times medications used for the treatment of anxiety and depression may cause suicidal ideation.  Patient has had thoughts of suicide, but no plan.  She was advised that should this worsen it is a medical emergency and should be treated as such. Will follow up in two weeks.     Lipid screening  -     Lipid panel; Future      An After Visit Summary was printed and given to the patient at discharge.  Return in about 2 weeks (around 4/12/2019).       Fely Gilman, KAIN . 3/29/2019    No assistance needed

## 2023-09-13 NOTE — TELEPHONE ENCOUNTER
----- Message from KAIN Pederson sent at 11/8/2017 11:02 AM CST -----  BV panel indicates:   Trichomonas DETECTED  Mycoplasma hominis DETECTED  Ureaplasma DETECTED    Pt was given Flagyl the day of visit, please make sure she completes.   She will also need to take Doxycyline 100 mg PO BID x 7 days    Pap ASCUS, HPV pending.   Olanzapine Pregnancy And Lactation Text: This medication is pregnancy category C.   There are no adequate and well controlled trials with olanzapine in pregnant females.  Olanzapine should be used during pregnancy only if the potential benefit justifies the potential risk to the fetus.   In a study in lactating healthy women, olanzapine was excreted in breast milk.  It is recommended that women taking olanzapine should not breast feed.

## 2024-03-07 ENCOUNTER — TRANSCRIBE ORDERS (OUTPATIENT)
Dept: ADMINISTRATIVE | Facility: HOSPITAL | Age: 44
End: 2024-03-07
Payer: MEDICAID

## 2024-03-07 DIAGNOSIS — Z12.31 ENCOUNTER FOR SCREENING MAMMOGRAM FOR MALIGNANT NEOPLASM OF BREAST: Primary | ICD-10-CM

## 2024-04-14 ENCOUNTER — HOSPITAL ENCOUNTER (INPATIENT)
Age: 44
LOS: 4 days | Discharge: NURSING, REHAB OR LONG-TERM CARE | DRG: 438 | End: 2024-04-18
Attending: EMERGENCY MEDICINE | Admitting: HOSPITALIST
Payer: MEDICAID

## 2024-04-14 ENCOUNTER — APPOINTMENT (OUTPATIENT)
Dept: CT IMAGING | Age: 44
DRG: 438 | End: 2024-04-14
Payer: MEDICAID

## 2024-04-14 DIAGNOSIS — E86.0 DEHYDRATION: ICD-10-CM

## 2024-04-14 DIAGNOSIS — E87.6 HYPOKALEMIA: ICD-10-CM

## 2024-04-14 DIAGNOSIS — R11.2 NAUSEA AND VOMITING, UNSPECIFIED VOMITING TYPE: Primary | ICD-10-CM

## 2024-04-14 DIAGNOSIS — K85.90 ACUTE PANCREATITIS, UNSPECIFIED COMPLICATION STATUS, UNSPECIFIED PANCREATITIS TYPE: ICD-10-CM

## 2024-04-14 LAB
ALBUMIN SERPL-MCNC: 4.6 G/DL (ref 3.5–5.2)
ALP SERPL-CCNC: 169 U/L (ref 35–104)
ALT SERPL-CCNC: 13 U/L (ref 5–33)
ANION GAP SERPL CALCULATED.3IONS-SCNC: 23 MMOL/L (ref 7–19)
AST SERPL-CCNC: 50 U/L (ref 5–32)
B PARAP IS1001 DNA NPH QL NAA+NON-PROBE: NOT DETECTED
B PERT.PT PRMT NPH QL NAA+NON-PROBE: NOT DETECTED
BACTERIA #/AREA URNS HPF: ABNORMAL /HPF
BASE EXCESS VENOUS: 5 MMOL/L
BASOPHILS # BLD: 0 K/UL (ref 0–0.2)
BASOPHILS NFR BLD: 0 % (ref 0–1)
BILIRUB SERPL-MCNC: 0.8 MG/DL (ref 0.2–1.2)
BILIRUB UR QL STRIP: NEGATIVE
BUN SERPL-MCNC: 32 MG/DL (ref 6–20)
C PNEUM DNA NPH QL NAA+NON-PROBE: NOT DETECTED
CALCIUM SERPL-MCNC: 10.3 MG/DL (ref 8.6–10)
CARBOXYHEMOGLOBIN: 2.7 %
CASTS #/AREA URNS LPF: ABNORMAL /LPF
CHLORIDE SERPL-SCNC: 87 MMOL/L (ref 98–111)
CLARITY UR: CLEAR
CO2 SERPL-SCNC: 24 MMOL/L (ref 22–29)
COLOR UR: YELLOW
CREAT SERPL-MCNC: 0.7 MG/DL (ref 0.5–0.9)
CRYSTALS URNS MICRO: ABNORMAL /HPF
EOSINOPHIL # BLD: 0 K/UL (ref 0–0.6)
EOSINOPHIL NFR BLD: 0.2 % (ref 0–5)
ERYTHROCYTE [DISTWIDTH] IN BLOOD BY AUTOMATED COUNT: 19.1 % (ref 11.5–14.5)
FLUAV RNA NPH QL NAA+NON-PROBE: NOT DETECTED
FLUBV RNA NPH QL NAA+NON-PROBE: NOT DETECTED
GLUCOSE SERPL-MCNC: 145 MG/DL (ref 74–109)
GLUCOSE UR STRIP.AUTO-MCNC: NEGATIVE MG/DL
HADV DNA NPH QL NAA+NON-PROBE: NOT DETECTED
HCG UR QL: NEGATIVE
HCO3 VENOUS: 28 MMOL/L (ref 23–29)
HCOV 229E RNA NPH QL NAA+NON-PROBE: NOT DETECTED
HCOV HKU1 RNA NPH QL NAA+NON-PROBE: NOT DETECTED
HCOV NL63 RNA NPH QL NAA+NON-PROBE: NOT DETECTED
HCOV OC43 RNA NPH QL NAA+NON-PROBE: NOT DETECTED
HCT VFR BLD AUTO: 32.4 % (ref 37–47)
HGB BLD-MCNC: 11.1 G/DL (ref 12–16)
HGB UR STRIP.AUTO-MCNC: NEGATIVE MG/L
HMPV RNA NPH QL NAA+NON-PROBE: NOT DETECTED
HPIV1 RNA NPH QL NAA+NON-PROBE: NOT DETECTED
HPIV2 RNA NPH QL NAA+NON-PROBE: NOT DETECTED
HPIV3 RNA NPH QL NAA+NON-PROBE: NOT DETECTED
HPIV4 RNA NPH QL NAA+NON-PROBE: NOT DETECTED
IMM GRANULOCYTES # BLD: 0 K/UL
KETONES UR STRIP.AUTO-MCNC: 15 MG/DL
LACTATE BLDV-SCNC: 1.9 MMOL/L (ref 0.5–1.9)
LACTATE BLDV-SCNC: 3.7 MG/DL (ref 0.5–1.9)
LEUKOCYTE ESTERASE UR QL STRIP.AUTO: NEGATIVE
LIPASE SERPL-CCNC: 464 U/L (ref 13–60)
LYMPHOCYTES # BLD: 0.7 K/UL (ref 1.1–4.5)
LYMPHOCYTES NFR BLD: 14.2 % (ref 20–40)
M PNEUMO DNA NPH QL NAA+NON-PROBE: NOT DETECTED
MAGNESIUM SERPL-MCNC: 1.6 MG/DL (ref 1.6–2.6)
MCH RBC QN AUTO: 34.2 PG (ref 27–31)
MCHC RBC AUTO-ENTMCNC: 34.3 G/DL (ref 33–37)
MCV RBC AUTO: 99.7 FL (ref 81–99)
METHEMOGLOBIN VENOUS: 1.2 %
MONOCYTES # BLD: 0.3 K/UL (ref 0–0.9)
MONOCYTES NFR BLD: 5.9 % (ref 0–10)
MUCOUS THREADS URNS QL MICRO: ABNORMAL /LPF
NEUTROPHILS # BLD: 3.6 K/UL (ref 1.5–7.5)
NEUTS SEG NFR BLD: 79.5 % (ref 50–65)
NITRITE UR QL STRIP.AUTO: NEGATIVE
O2 CONTENT, VEN: 6 ML/DL
O2 SAT, VEN: 42 %
PCO2, VEN: 36 MMHG (ref 40–50)
PH UR STRIP.AUTO: 6 [PH] (ref 5–8)
PH VENOUS: 7.5 (ref 7.35–7.45)
PLATELET # BLD AUTO: 185 K/UL (ref 130–400)
PMV BLD AUTO: 11 FL (ref 9.4–12.3)
PO2, VEN: 27 MMHG
POTASSIUM SERPL-SCNC: 3.1 MMOL/L (ref 3.5–5)
PROCALCITONIN: 1.08 NG/ML (ref 0–0.09)
PROT SERPL-MCNC: 8.3 G/DL (ref 6.6–8.7)
PROT UR STRIP.AUTO-MCNC: 100 MG/DL
RBC # BLD AUTO: 3.25 M/UL (ref 4.2–5.4)
RBC #/AREA URNS HPF: ABNORMAL /HPF (ref 0–2)
RSV RNA NPH QL NAA+NON-PROBE: NOT DETECTED
RV+EV RNA NPH QL NAA+NON-PROBE: NOT DETECTED
SARS-COV-2 RNA NPH QL NAA+NON-PROBE: NOT DETECTED
SODIUM SERPL-SCNC: 134 MMOL/L (ref 136–145)
SP GR UR STRIP.AUTO: >=1.045 (ref 1–1.03)
SQUAMOUS #/AREA URNS HPF: ABNORMAL /HPF
TRIGL SERPL-MCNC: 250 MG/DL (ref 0–149)
TSH SERPL DL<=0.005 MIU/L-ACNC: 3.84 UIU/ML (ref 0.27–4.2)
UROBILINOGEN UR STRIP.AUTO-MCNC: 1 E.U./DL
WBC # BLD AUTO: 4.6 K/UL (ref 4.8–10.8)

## 2024-04-14 PROCEDURE — 83605 ASSAY OF LACTIC ACID: CPT

## 2024-04-14 PROCEDURE — 36415 COLL VENOUS BLD VENIPUNCTURE: CPT

## 2024-04-14 PROCEDURE — 80053 COMPREHEN METABOLIC PANEL: CPT

## 2024-04-14 PROCEDURE — 81001 URINALYSIS AUTO W/SCOPE: CPT

## 2024-04-14 PROCEDURE — 84443 ASSAY THYROID STIM HORMONE: CPT

## 2024-04-14 PROCEDURE — 83735 ASSAY OF MAGNESIUM: CPT

## 2024-04-14 PROCEDURE — 93005 ELECTROCARDIOGRAM TRACING: CPT | Performed by: EMERGENCY MEDICINE

## 2024-04-14 PROCEDURE — 96374 THER/PROPH/DIAG INJ IV PUSH: CPT

## 2024-04-14 PROCEDURE — 84478 ASSAY OF TRIGLYCERIDES: CPT

## 2024-04-14 PROCEDURE — 6360000002 HC RX W HCPCS: Performed by: HOSPITALIST

## 2024-04-14 PROCEDURE — 2580000003 HC RX 258: Performed by: HOSPITALIST

## 2024-04-14 PROCEDURE — 83690 ASSAY OF LIPASE: CPT

## 2024-04-14 PROCEDURE — 87040 BLOOD CULTURE FOR BACTERIA: CPT

## 2024-04-14 PROCEDURE — 84703 CHORIONIC GONADOTROPIN ASSAY: CPT

## 2024-04-14 PROCEDURE — 96361 HYDRATE IV INFUSION ADD-ON: CPT

## 2024-04-14 PROCEDURE — 6360000004 HC RX CONTRAST MEDICATION: Performed by: HOSPITALIST

## 2024-04-14 PROCEDURE — 96375 TX/PRO/DX INJ NEW DRUG ADDON: CPT

## 2024-04-14 PROCEDURE — 74177 CT ABD & PELVIS W/CONTRAST: CPT

## 2024-04-14 PROCEDURE — 82800 BLOOD PH: CPT

## 2024-04-14 PROCEDURE — 84145 PROCALCITONIN (PCT): CPT

## 2024-04-14 PROCEDURE — 82803 BLOOD GASES ANY COMBINATION: CPT

## 2024-04-14 PROCEDURE — 6360000002 HC RX W HCPCS: Performed by: EMERGENCY MEDICINE

## 2024-04-14 PROCEDURE — C9113 INJ PANTOPRAZOLE SODIUM, VIA: HCPCS | Performed by: HOSPITALIST

## 2024-04-14 PROCEDURE — 85025 COMPLETE CBC W/AUTO DIFF WBC: CPT

## 2024-04-14 PROCEDURE — 99285 EMERGENCY DEPT VISIT HI MDM: CPT

## 2024-04-14 PROCEDURE — 0202U NFCT DS 22 TRGT SARS-COV-2: CPT

## 2024-04-14 PROCEDURE — 1200000000 HC SEMI PRIVATE

## 2024-04-14 PROCEDURE — 2580000003 HC RX 258: Performed by: EMERGENCY MEDICINE

## 2024-04-14 RX ORDER — MORPHINE SULFATE 2 MG/ML
1 INJECTION, SOLUTION INTRAMUSCULAR; INTRAVENOUS EVERY 4 HOURS PRN
Status: DISCONTINUED | OUTPATIENT
Start: 2024-04-14 | End: 2024-04-15

## 2024-04-14 RX ORDER — MAGNESIUM SULFATE IN WATER 40 MG/ML
2000 INJECTION, SOLUTION INTRAVENOUS PRN
Status: DISCONTINUED | OUTPATIENT
Start: 2024-04-14 | End: 2024-04-18 | Stop reason: HOSPADM

## 2024-04-14 RX ORDER — SODIUM CHLORIDE 0.9 % (FLUSH) 0.9 %
5-40 SYRINGE (ML) INJECTION PRN
Status: DISCONTINUED | OUTPATIENT
Start: 2024-04-14 | End: 2024-04-18 | Stop reason: HOSPADM

## 2024-04-14 RX ORDER — SODIUM CHLORIDE 0.9 % (FLUSH) 0.9 %
5-40 SYRINGE (ML) INJECTION EVERY 12 HOURS SCHEDULED
Status: DISCONTINUED | OUTPATIENT
Start: 2024-04-14 | End: 2024-04-18 | Stop reason: HOSPADM

## 2024-04-14 RX ORDER — MORPHINE SULFATE 2 MG/ML
2 INJECTION, SOLUTION INTRAMUSCULAR; INTRAVENOUS
Status: DISCONTINUED | OUTPATIENT
Start: 2024-04-14 | End: 2024-04-18 | Stop reason: HOSPADM

## 2024-04-14 RX ORDER — SODIUM CHLORIDE, SODIUM LACTATE, POTASSIUM CHLORIDE, CALCIUM CHLORIDE 600; 310; 30; 20 MG/100ML; MG/100ML; MG/100ML; MG/100ML
INJECTION, SOLUTION INTRAVENOUS CONTINUOUS
Status: DISCONTINUED | OUTPATIENT
Start: 2024-04-15 | End: 2024-04-16

## 2024-04-14 RX ORDER — ENOXAPARIN SODIUM 100 MG/ML
30 INJECTION SUBCUTANEOUS DAILY
Status: DISCONTINUED | OUTPATIENT
Start: 2024-04-15 | End: 2024-04-18 | Stop reason: HOSPADM

## 2024-04-14 RX ORDER — POTASSIUM CHLORIDE 29.8 MG/ML
20 INJECTION INTRAVENOUS PRN
Status: DISCONTINUED | OUTPATIENT
Start: 2024-04-14 | End: 2024-04-17

## 2024-04-14 RX ORDER — METOCLOPRAMIDE HYDROCHLORIDE 5 MG/ML
10 INJECTION INTRAMUSCULAR; INTRAVENOUS ONCE
Status: COMPLETED | OUTPATIENT
Start: 2024-04-14 | End: 2024-04-14

## 2024-04-14 RX ORDER — POTASSIUM CHLORIDE 7.45 MG/ML
10 INJECTION INTRAVENOUS ONCE
Status: COMPLETED | OUTPATIENT
Start: 2024-04-14 | End: 2024-04-14

## 2024-04-14 RX ORDER — MORPHINE SULFATE 4 MG/ML
4 INJECTION, SOLUTION INTRAMUSCULAR; INTRAVENOUS
Status: DISCONTINUED | OUTPATIENT
Start: 2024-04-14 | End: 2024-04-15

## 2024-04-14 RX ORDER — PANTOPRAZOLE SODIUM 40 MG/10ML
40 INJECTION, POWDER, LYOPHILIZED, FOR SOLUTION INTRAVENOUS 2 TIMES DAILY
Status: DISCONTINUED | OUTPATIENT
Start: 2024-04-14 | End: 2024-04-18 | Stop reason: HOSPADM

## 2024-04-14 RX ORDER — SODIUM CHLORIDE 9 MG/ML
INJECTION, SOLUTION INTRAVENOUS PRN
Status: DISCONTINUED | OUTPATIENT
Start: 2024-04-14 | End: 2024-04-18 | Stop reason: HOSPADM

## 2024-04-14 RX ORDER — SODIUM CHLORIDE, SODIUM LACTATE, POTASSIUM CHLORIDE, AND CALCIUM CHLORIDE .6; .31; .03; .02 G/100ML; G/100ML; G/100ML; G/100ML
30 INJECTION, SOLUTION INTRAVENOUS ONCE
Status: COMPLETED | OUTPATIENT
Start: 2024-04-14 | End: 2024-04-14

## 2024-04-14 RX ORDER — POTASSIUM CHLORIDE 7.45 MG/ML
10 INJECTION INTRAVENOUS PRN
Status: DISCONTINUED | OUTPATIENT
Start: 2024-04-14 | End: 2024-04-17

## 2024-04-14 RX ORDER — SODIUM CHLORIDE, SODIUM LACTATE, POTASSIUM CHLORIDE, AND CALCIUM CHLORIDE .6; .31; .03; .02 G/100ML; G/100ML; G/100ML; G/100ML
1000 INJECTION, SOLUTION INTRAVENOUS ONCE
Status: DISCONTINUED | OUTPATIENT
Start: 2024-04-14 | End: 2024-04-14

## 2024-04-14 RX ORDER — SODIUM CHLORIDE, SODIUM LACTATE, POTASSIUM CHLORIDE, CALCIUM CHLORIDE 600; 310; 30; 20 MG/100ML; MG/100ML; MG/100ML; MG/100ML
INJECTION, SOLUTION INTRAVENOUS CONTINUOUS
Status: ACTIVE | OUTPATIENT
Start: 2024-04-14 | End: 2024-04-15

## 2024-04-14 RX ORDER — ONDANSETRON 2 MG/ML
4 INJECTION INTRAMUSCULAR; INTRAVENOUS EVERY 6 HOURS PRN
Status: DISCONTINUED | OUTPATIENT
Start: 2024-04-14 | End: 2024-04-18 | Stop reason: HOSPADM

## 2024-04-14 RX ADMIN — ONDANSETRON 4 MG: 2 INJECTION INTRAMUSCULAR; INTRAVENOUS at 22:31

## 2024-04-14 RX ADMIN — METOCLOPRAMIDE 10 MG: 5 INJECTION, SOLUTION INTRAMUSCULAR; INTRAVENOUS at 19:06

## 2024-04-14 RX ADMIN — PANTOPRAZOLE SODIUM 40 MG: 40 INJECTION, POWDER, FOR SOLUTION INTRAVENOUS at 22:14

## 2024-04-14 RX ADMIN — IOPAMIDOL 70 ML: 755 INJECTION, SOLUTION INTRAVENOUS at 20:13

## 2024-04-14 RX ADMIN — SODIUM CHLORIDE, POTASSIUM CHLORIDE, SODIUM LACTATE AND CALCIUM CHLORIDE: 600; 310; 30; 20 INJECTION, SOLUTION INTRAVENOUS at 22:05

## 2024-04-14 RX ADMIN — SODIUM CHLORIDE, PRESERVATIVE FREE 10 ML: 5 INJECTION INTRAVENOUS at 22:07

## 2024-04-14 RX ADMIN — POTASSIUM CHLORIDE 10 MEQ: 7.46 INJECTION, SOLUTION INTRAVENOUS at 20:03

## 2024-04-14 RX ADMIN — SODIUM CHLORIDE, POTASSIUM CHLORIDE, SODIUM LACTATE AND CALCIUM CHLORIDE 1000 ML: 600; 310; 30; 20 INJECTION, SOLUTION INTRAVENOUS at 19:09

## 2024-04-14 NOTE — ED PROVIDER NOTES
LACTIC ACID   LACTATE, SEPSIS       All other labs were within normal range or not returned as of this dictation.    Medications   lactated ringers IV soln infusion ( IntraVENous New Bag 4/14/24 2205)     Followed by   lactated ringers IV soln infusion (has no administration in time range)   sodium chloride flush 0.9 % injection 5-40 mL (10 mLs IntraVENous Given 4/14/24 2207)   sodium chloride flush 0.9 % injection 5-40 mL (has no administration in time range)   0.9 % sodium chloride infusion (has no administration in time range)   potassium chloride 20 mEq/50 mL IVPB (Central Line) (has no administration in time range)     Or   potassium chloride 10 mEq/100 mL IVPB (Peripheral Line) (has no administration in time range)   magnesium sulfate 2000 mg in 50 mL IVPB premix (has no administration in time range)   enoxaparin Sodium (LOVENOX) injection 30 mg (has no administration in time range)   pantoprazole (PROTONIX) injection 40 mg (40 mg IntraVENous Given 4/14/24 2214)   sodium phosphates 15 mmol in sodium chloride 0.9 % 250 mL IVPB (has no administration in time range)   morphine (PF) injection 2 mg (has no administration in time range)     Or   morphine sulfate (PF) injection 4 mg (has no administration in time range)   morphine (PF) injection 1 mg (has no administration in time range)   ondansetron (ZOFRAN) injection 4 mg (4 mg IntraVENous Given 4/14/24 2231)   lactated ringers bolus 1,128 mL (0 mLs IntraVENous Stopped 4/14/24 2110)   metoclopramide (REGLAN) injection 10 mg (10 mg IntraVENous Given 4/14/24 1906)   potassium chloride 10 mEq/100 mL IVPB (Peripheral Line) (10 mEq IntraVENous New Bag 4/14/24 2003)   iopamidol (ISOVUE-370) 76 % injection 70 mL (70 mLs IntraVENous Given 4/14/24 2013)       EMERGENCY DEPARTMENT COURSE and DIFFERENTIALDIAGNOSIS/MDM:   Vitals:    Vitals:    04/14/24 1858 04/14/24 2003 04/14/24 2035 04/14/24 2121   BP:  98/82 105/80 96/73   Pulse:  (!) 107 82 (!) 103   Resp:  17 18 18

## 2024-04-15 ENCOUNTER — APPOINTMENT (OUTPATIENT)
Dept: ULTRASOUND IMAGING | Age: 44
DRG: 438 | End: 2024-04-15
Payer: MEDICAID

## 2024-04-15 PROBLEM — F10.10 ALCOHOL ABUSE: Status: ACTIVE | Noted: 2024-04-15

## 2024-04-15 PROBLEM — E43 SEVERE MALNUTRITION (HCC): Status: ACTIVE | Noted: 2024-04-15

## 2024-04-15 LAB
ALBUMIN SERPL-MCNC: 3.9 G/DL (ref 3.5–5.2)
ALP SERPL-CCNC: 137 U/L (ref 35–104)
ALT SERPL-CCNC: 10 U/L (ref 5–33)
AMPHET UR QL SCN: NEGATIVE
ANION GAP SERPL CALCULATED.3IONS-SCNC: 17 MMOL/L (ref 7–19)
AST SERPL-CCNC: 44 U/L (ref 5–32)
BARBITURATES UR QL SCN: NEGATIVE
BASOPHILS # BLD: 0 K/UL (ref 0–0.2)
BASOPHILS NFR BLD: 0.2 % (ref 0–1)
BENZODIAZ UR QL SCN: POSITIVE
BILIRUB SERPL-MCNC: 0.6 MG/DL (ref 0.2–1.2)
BUN SERPL-MCNC: 23 MG/DL (ref 6–20)
BUPRENORPHINE URINE: NEGATIVE
CALCIUM SERPL-MCNC: 9.6 MG/DL (ref 8.6–10)
CANNABINOIDS UR QL SCN: NEGATIVE
CHLORIDE SERPL-SCNC: 89 MMOL/L (ref 98–111)
CO2 SERPL-SCNC: 27 MMOL/L (ref 22–29)
COCAINE UR QL SCN: NEGATIVE
CREAT SERPL-MCNC: 0.5 MG/DL (ref 0.5–0.9)
DRUG SCREEN COMMENT UR-IMP: ABNORMAL
EKG P AXIS: 78 DEGREES
EKG P-R INTERVAL: 140 MS
EKG Q-T INTERVAL: 332 MS
EKG QRS DURATION: 78 MS
EKG QTC CALCULATION (BAZETT): 410 MS
EKG T AXIS: 78 DEGREES
EOSINOPHIL # BLD: 0 K/UL (ref 0–0.6)
EOSINOPHIL NFR BLD: 0 % (ref 0–5)
ERYTHROCYTE [DISTWIDTH] IN BLOOD BY AUTOMATED COUNT: 19.5 % (ref 11.5–14.5)
ETHANOLAMINE SERPL-MCNC: <10 MG/DL (ref 0–0.08)
FENTANYL SCREEN, URINE: NEGATIVE
GLUCOSE SERPL-MCNC: 106 MG/DL (ref 74–109)
HCT VFR BLD AUTO: 26.9 % (ref 37–47)
HGB BLD-MCNC: 9.1 G/DL (ref 12–16)
IMM GRANULOCYTES # BLD: 0 K/UL
LACTATE BLDV-SCNC: 1.3 MG/DL (ref 0.5–1.9)
LACTATE BLDV-SCNC: 1.5 MG/DL (ref 0.5–1.9)
LYMPHOCYTES # BLD: 0.9 K/UL (ref 1.1–4.5)
LYMPHOCYTES NFR BLD: 20.6 % (ref 20–40)
MCH RBC QN AUTO: 34.5 PG (ref 27–31)
MCHC RBC AUTO-ENTMCNC: 33.8 G/DL (ref 33–37)
MCV RBC AUTO: 101.9 FL (ref 81–99)
METHADONE UR QL SCN: NEGATIVE
METHAMPHETAMINE, URINE: NEGATIVE
MONOCYTES # BLD: 0.2 K/UL (ref 0–0.9)
MONOCYTES NFR BLD: 5.1 % (ref 0–10)
NEUTROPHILS # BLD: 3.3 K/UL (ref 1.5–7.5)
NEUTS SEG NFR BLD: 73.9 % (ref 50–65)
OPIATES UR QL SCN: NEGATIVE
OXYCODONE UR QL SCN: NEGATIVE
PCP UR QL SCN: NEGATIVE
PLATELET # BLD AUTO: 144 K/UL (ref 130–400)
PMV BLD AUTO: 10.7 FL (ref 9.4–12.3)
POTASSIUM SERPL-SCNC: 3.8 MMOL/L (ref 3.5–5)
PROT SERPL-MCNC: 6.7 G/DL (ref 6.6–8.7)
RBC # BLD AUTO: 2.64 M/UL (ref 4.2–5.4)
SODIUM SERPL-SCNC: 133 MMOL/L (ref 136–145)
TRICYCLIC, URINE: NEGATIVE
TRIGL SERPL-MCNC: 146 MG/DL (ref 0–149)
WBC # BLD AUTO: 4.5 K/UL (ref 4.8–10.8)

## 2024-04-15 PROCEDURE — 94760 N-INVAS EAR/PLS OXIMETRY 1: CPT

## 2024-04-15 PROCEDURE — 1200000000 HC SEMI PRIVATE

## 2024-04-15 PROCEDURE — 76705 ECHO EXAM OF ABDOMEN: CPT

## 2024-04-15 PROCEDURE — 2580000003 HC RX 258: Performed by: HOSPITALIST

## 2024-04-15 PROCEDURE — G0480 DRUG TEST DEF 1-7 CLASSES: HCPCS

## 2024-04-15 PROCEDURE — 36415 COLL VENOUS BLD VENIPUNCTURE: CPT

## 2024-04-15 PROCEDURE — C9113 INJ PANTOPRAZOLE SODIUM, VIA: HCPCS | Performed by: HOSPITALIST

## 2024-04-15 PROCEDURE — 6370000000 HC RX 637 (ALT 250 FOR IP): Performed by: NURSE PRACTITIONER

## 2024-04-15 PROCEDURE — 83605 ASSAY OF LACTIC ACID: CPT

## 2024-04-15 PROCEDURE — 84478 ASSAY OF TRIGLYCERIDES: CPT

## 2024-04-15 PROCEDURE — 6360000002 HC RX W HCPCS: Performed by: HOSPITALIST

## 2024-04-15 PROCEDURE — 99222 1ST HOSP IP/OBS MODERATE 55: CPT | Performed by: INTERNAL MEDICINE

## 2024-04-15 PROCEDURE — 82077 ASSAY SPEC XCP UR&BREATH IA: CPT

## 2024-04-15 PROCEDURE — 6370000000 HC RX 637 (ALT 250 FOR IP): Performed by: HOSPITALIST

## 2024-04-15 PROCEDURE — 85025 COMPLETE CBC W/AUTO DIFF WBC: CPT

## 2024-04-15 PROCEDURE — 93010 ELECTROCARDIOGRAM REPORT: CPT | Performed by: INTERNAL MEDICINE

## 2024-04-15 PROCEDURE — 80053 COMPREHEN METABOLIC PANEL: CPT

## 2024-04-15 PROCEDURE — 80307 DRUG TEST PRSMV CHEM ANLYZR: CPT

## 2024-04-15 RX ORDER — CHLORDIAZEPOXIDE HYDROCHLORIDE 25 MG/1
25 CAPSULE, GELATIN COATED ORAL 4 TIMES DAILY
Status: DISCONTINUED | OUTPATIENT
Start: 2024-04-15 | End: 2024-04-15

## 2024-04-15 RX ORDER — LORAZEPAM 1 MG/1
1 TABLET ORAL
Status: DISCONTINUED | OUTPATIENT
Start: 2024-04-15 | End: 2024-04-18 | Stop reason: HOSPADM

## 2024-04-15 RX ORDER — MULTIVITAMIN WITH IRON
1 TABLET ORAL DAILY
Status: DISCONTINUED | OUTPATIENT
Start: 2024-04-15 | End: 2024-04-18 | Stop reason: HOSPADM

## 2024-04-15 RX ORDER — LORAZEPAM 2 MG/1
4 TABLET ORAL
Status: DISCONTINUED | OUTPATIENT
Start: 2024-04-15 | End: 2024-04-18 | Stop reason: HOSPADM

## 2024-04-15 RX ORDER — LORAZEPAM 2 MG/ML
1 INJECTION INTRAMUSCULAR
Status: DISCONTINUED | OUTPATIENT
Start: 2024-04-15 | End: 2024-04-18 | Stop reason: HOSPADM

## 2024-04-15 RX ORDER — LORAZEPAM 2 MG/ML
2 INJECTION INTRAMUSCULAR
Status: DISCONTINUED | OUTPATIENT
Start: 2024-04-15 | End: 2024-04-18 | Stop reason: HOSPADM

## 2024-04-15 RX ORDER — LORAZEPAM 2 MG/ML
3 INJECTION INTRAMUSCULAR
Status: DISCONTINUED | OUTPATIENT
Start: 2024-04-15 | End: 2024-04-18 | Stop reason: HOSPADM

## 2024-04-15 RX ORDER — FOLIC ACID 1 MG/1
1 TABLET ORAL DAILY
Status: DISCONTINUED | OUTPATIENT
Start: 2024-04-15 | End: 2024-04-18 | Stop reason: HOSPADM

## 2024-04-15 RX ORDER — CHLORDIAZEPOXIDE HYDROCHLORIDE 10 MG/1
10 CAPSULE, GELATIN COATED ORAL 4 TIMES DAILY
Status: DISCONTINUED | OUTPATIENT
Start: 2024-04-15 | End: 2024-04-16

## 2024-04-15 RX ORDER — LORAZEPAM 2 MG/ML
4 INJECTION INTRAMUSCULAR
Status: DISCONTINUED | OUTPATIENT
Start: 2024-04-15 | End: 2024-04-18 | Stop reason: HOSPADM

## 2024-04-15 RX ORDER — LORAZEPAM 2 MG/1
2 TABLET ORAL
Status: DISCONTINUED | OUTPATIENT
Start: 2024-04-15 | End: 2024-04-18 | Stop reason: HOSPADM

## 2024-04-15 RX ORDER — LORAZEPAM 2 MG/1
2 TABLET ORAL 2 TIMES DAILY
Status: DISCONTINUED | OUTPATIENT
Start: 2024-04-16 | End: 2024-04-15

## 2024-04-15 RX ORDER — LORAZEPAM 2 MG/1
2 TABLET ORAL 3 TIMES DAILY
Status: DISCONTINUED | OUTPATIENT
Start: 2024-04-15 | End: 2024-04-15

## 2024-04-15 RX ORDER — GAUZE BANDAGE 2" X 2"
100 BANDAGE TOPICAL DAILY
Status: DISCONTINUED | OUTPATIENT
Start: 2024-04-15 | End: 2024-04-18 | Stop reason: HOSPADM

## 2024-04-15 RX ADMIN — SODIUM CHLORIDE, POTASSIUM CHLORIDE, SODIUM LACTATE AND CALCIUM CHLORIDE: 600; 310; 30; 20 INJECTION, SOLUTION INTRAVENOUS at 06:28

## 2024-04-15 RX ADMIN — FOLIC ACID 1 MG: 1 TABLET ORAL at 07:51

## 2024-04-15 RX ADMIN — Medication 100 MG: at 07:51

## 2024-04-15 RX ADMIN — PANTOPRAZOLE SODIUM 40 MG: 40 INJECTION, POWDER, FOR SOLUTION INTRAVENOUS at 07:50

## 2024-04-15 RX ADMIN — ONDANSETRON 4 MG: 2 INJECTION INTRAMUSCULAR; INTRAVENOUS at 08:00

## 2024-04-15 RX ADMIN — PANTOPRAZOLE SODIUM 40 MG: 40 INJECTION, POWDER, FOR SOLUTION INTRAVENOUS at 20:54

## 2024-04-15 RX ADMIN — THERA TABS 1 TABLET: TAB at 07:51

## 2024-04-15 RX ADMIN — ENOXAPARIN SODIUM 30 MG: 100 INJECTION SUBCUTANEOUS at 07:50

## 2024-04-15 RX ADMIN — CHLORDIAZEPOXIDE HYDROCHLORIDE 10 MG: 10 CAPSULE ORAL at 20:54

## 2024-04-15 RX ADMIN — CHLORDIAZEPOXIDE HYDROCHLORIDE 10 MG: 10 CAPSULE ORAL at 16:46

## 2024-04-15 RX ADMIN — LORAZEPAM 2 MG: 2 TABLET ORAL at 07:51

## 2024-04-15 SDOH — ECONOMIC STABILITY: INCOME INSECURITY: HOW HARD IS IT FOR YOU TO PAY FOR THE VERY BASICS LIKE FOOD, HOUSING, MEDICAL CARE, AND HEATING?: SOMEWHAT HARD

## 2024-04-15 SDOH — ECONOMIC STABILITY: INCOME INSECURITY: IN THE PAST 12 MONTHS, HAS THE ELECTRIC, GAS, OIL, OR WATER COMPANY THREATENED TO SHUT OFF SERVICE IN YOUR HOME?: NO

## 2024-04-15 SDOH — ECONOMIC STABILITY: FOOD INSECURITY: WITHIN THE PAST 12 MONTHS, YOU WORRIED THAT YOUR FOOD WOULD RUN OUT BEFORE YOU GOT MONEY TO BUY MORE.: NEVER TRUE

## 2024-04-15 ASSESSMENT — PATIENT HEALTH QUESTIONNAIRE - PHQ9
SUM OF ALL RESPONSES TO PHQ QUESTIONS 1-9: 4
SUM OF ALL RESPONSES TO PHQ QUESTIONS 1-9: 4
1. LITTLE INTEREST OR PLEASURE IN DOING THINGS: MORE THAN HALF THE DAYS
2. FEELING DOWN, DEPRESSED OR HOPELESS: MORE THAN HALF THE DAYS
SUM OF ALL RESPONSES TO PHQ9 QUESTIONS 1 & 2: 4
SUM OF ALL RESPONSES TO PHQ QUESTIONS 1-9: 4
SUM OF ALL RESPONSES TO PHQ QUESTIONS 1-9: 4

## 2024-04-15 ASSESSMENT — PAIN SCALES - GENERAL: PAINLEVEL_OUTOF10: 0

## 2024-04-15 ASSESSMENT — ENCOUNTER SYMPTOMS
ABDOMINAL PAIN: 1
NAUSEA: 1

## 2024-04-15 NOTE — H&P
[facility]      Hospitalist - History & Physical      PCP: No primary care provider on file.    Date of Admission: 4/14/2024    Date of Service: 4/14/2024    Chief Complaint:  Nausea, vomiting and abdominal pain     History Of Present Illness:   The patient is a 43 y.o. female who presented to [facility] ED for evaluation of nausea, vomiting and abdominal pain. Pt has history of chronic pain and depression.    Pt is here with family who assists with history of present illness. Pt is a vague historian. She has had problems with nausea, vomiting and abdominal pain over past 3-4 days. She denies fevers, alcohol consumption as well as problems with diarrhea. She denies prior episodes of pancreatitis.     In ED, CT abd/pelvis report pending. lactic acid 3.7, lipase 464, sodium 134, potassium 3.1, creatinine 0.7, bun 32, glucose 145, alk phos 169, alt 13, ast 50, VBG-pH 7.5, wbc 4.6k, hgb 11.1, platelets 185k. Pt is admitted inpatient to hospitalist    Past Medical History:     Chronic back pain   Anxiety and depression    Past Surgical History:     D&C   Tubal ligation     Home Medications:  Being reviewed    Allergies:    Patient has no known allergies.    Social History:    The patient currently lives at home  Tobacco:  daily smoker  Alcohol:  reports stopped alcohol use \"long time ago\"  Illicit Drugs: denies     Family History:  History reviewed. No pertinent family history.    Review of Systems:   Review of Systems   Constitutional:  Positive for unexpected weight change.   Gastrointestinal:  Positive for abdominal pain, nausea and vomiting.   All other systems reviewed and are negative.     14 point review of systems is negative except as specifically addressed above.    Physical Examination:  /80   Pulse 82   Temp 98.9 °F (37.2 °C)   Resp 18   Ht 1.651 m (5' 5\")   Wt 37.6 kg (83 lb)   SpO2 100%   BMI 13.81 kg/m²   Physical Exam  Vitals reviewed.   Constitutional:       Comments: Frail appearing 43

## 2024-04-15 NOTE — ED NOTES
ED TO INPATIENT SBAR HANDOFF    Patient Name: Kandy Mercado   : 1980  43 y.o.   Family/Caregiver Present: Yes  Code Status Order: No Order    C-SSRS: Risk of Suicide: No Risk  Sitter No  Restraints:         Situation  Chief Complaint:   Chief Complaint   Patient presents with    Emesis     N/V, unable to eat anything the last 3 days, fatigue/weakness     Patient Diagnosis: Acute pancreatitis without infection or necrosis [K85.90]     Brief Description of Patient's Condition: Kandy Mercdao is a 43 y.o. female who presents to the emergency department for evaluation after having persistent nausea and vomiting with generalized weakness abdominal cramping, and fatigue.  Has had some associated cough and a tickle in her throat as well.  Denies any fevers.  Denies any focal abdominal pain.  No associated constipation or diarrhea.  Says she has not been able to keep getting down for several days and has gotten to the point where she is the getting up and walking around   Mental Status: oriented and alert  Arrived from: home    Imaging:   CT ABDOMEN PELVIS W IV CONTRAST Additional Contrast? Oral    (Results Pending)   US GALLBLADDER RUQ    (Results Pending)     COVID-19 Results:   Internal Administration   First Dose      Second Dose           Last COVID Lab No results found for: \"SARS-COV-2\", \"SARS-COV-2 RNA\", \"SARS-COV-2 RNA, RT PCR\", \"SARS-COV-2, JONES\", \"SARS-COV-2, NAAT\", \"SARS-COV-2 BY PCR\", \"POC\", \"SARS-COV-2, POC\", \"RAPID\", \"SARS-COV-2, RAPID\", \"SALIVA\", \"SARS-COV-2, SALIVA\"        Abnormal labs:   Abnormal Labs Reviewed   CBC WITH AUTO DIFFERENTIAL - Abnormal; Notable for the following components:       Result Value    WBC 4.6 (*)     RBC 3.25 (*)     Hemoglobin 11.1 (*)     Hematocrit 32.4 (*)     MCV 99.7 (*)     MCH 34.2 (*)     RDW 19.1 (*)     Neutrophils % 79.5 (*)     Lymphocytes % 14.2 (*)     Lymphocytes Absolute 0.7 (*)     All other components within normal limits   COMPREHENSIVE METABOLIC

## 2024-04-15 NOTE — DISCHARGE INSTRUCTIONS
Center  Get help with building a resume and searching for jobs.  [address] [address]  https://[facility].ky.gov/  231.663.6682    [facility]   Designed to help [place]’s most challenged job seekers overcome barriers that prevent them from finding success in the workforce.  [address] [place] [address]   [phone]  https://[employer].org/tqzprxgzo-colrgkakboa-sctbexu/     Other Assistance:  Low Income Home Energy Assistance Program (LIHEAP)  Federally-funded program to help eligible, low-income households meet their heating/cooling needs.   Website: https://www.chfs.ky.gov/agencies/dcbs/dfs/pdb/Pages/liheap.aspx  6.077.370.5574    [employer]  Serving single parent households, foster parents, and teens aging out of the foster care system. Provides clothing, home goods, and furniture to those who are most greatly in need of assistance.  [address], [address]nd floor [place], [place] [address]  [phone]    [employer]  Assistance with household items and clothing needs.   [address] [place], [place] [address]- at [facility]   Website: https://[employer]/acts  348.378.4116

## 2024-04-15 NOTE — CARE COORDINATION
SW attempted visit re: etoh resources; Pt with visitors in the room; will attempt tomorrow for privacy.  Electronically signed by CHETNA Ortega on 4/15/2024 at 4:52 PM

## 2024-04-15 NOTE — CONSULTS
Pt Name: Kandy Mercado  MRN: [id number]  [id number]  YOB: 1980  Admit Date: 4/14/2024  6:23 PM  Date of evaluation: 4/15/2024  Primary Care Physician: No primary care provider on file.   [id number]       Requesting Provider: Dr. Irene Urrutia  GI Consult    Indication: Nausea and vomiting.  Abdominal pain.  Pancreatitis.    History:  The patient is a 43 y.o. female admitted to the hospital for nausea and vomiting and abdominal pain.  Patient is currently sleepy and lethargic, received Ativan recently arousable.  She did not give me any good history.  Apparently the symptoms started about a day before coming to the hospital.  She was abdominal pain followed by nausea and vomiting.  Denies any previous history of similar symptoms.  She did not be any good history of her alcohol intake, apparently claims that she was drinking alcohol in the past but quit about 2 months back.  According to RN the family says that they found some empty bottles of alcohol in her room.  No heartburns or regurgitation regular basis.  No constipation or diarrhea.  No hematochezia or melanotic stool.  Overall she is feeling much better now.      Past Medical History:    History reviewed. No pertinent past medical history.  Past Surgical History:    History reviewed. No pertinent surgical history.  Allergies:  Patient has no known allergies.  Home Meds:  Prior to Admission medications    Not on File        Current Meds:       thiamine mononitrate  100 mg Oral Daily    folic acid  1 mg Oral Daily    multivitamin  1 tablet Oral Daily    chlordiazePOXIDE  10 mg Oral 4x Daily    sodium chloride flush  5-40 mL IntraVENous 2 times per day    enoxaparin  30 mg SubCUTAneous Daily    pantoprazole  40 mg IntraVENous BID        lactated ringers IV soln 150 mL/hr at 04/15/24 0628    sodium chloride           Social History:   Social History     Socioeconomic History    Marital status:      Spouse name: Not on file    Number of

## 2024-04-16 LAB
ANION GAP SERPL CALCULATED.3IONS-SCNC: 16 MMOL/L (ref 7–19)
BASOPHILS # BLD: 0 K/UL (ref 0–0.2)
BASOPHILS NFR BLD: 0.3 % (ref 0–1)
BUN SERPL-MCNC: 7 MG/DL (ref 6–20)
CALCIUM SERPL-MCNC: 9.1 MG/DL (ref 8.6–10)
CHLORIDE SERPL-SCNC: 95 MMOL/L (ref 98–111)
CO2 SERPL-SCNC: 28 MMOL/L (ref 22–29)
CREAT SERPL-MCNC: 0.4 MG/DL (ref 0.5–0.9)
EOSINOPHIL # BLD: 0 K/UL (ref 0–0.6)
EOSINOPHIL NFR BLD: 1.2 % (ref 0–5)
ERYTHROCYTE [DISTWIDTH] IN BLOOD BY AUTOMATED COUNT: 19.3 % (ref 11.5–14.5)
FERRITIN SERPL-MCNC: >2000 NG/ML (ref 13–150)
FOLATE SERPL-MCNC: 5.1 NG/ML (ref 4.8–37.3)
GLUCOSE SERPL-MCNC: 72 MG/DL (ref 74–109)
HCT VFR BLD AUTO: 21.5 % (ref 37–47)
HGB BLD-MCNC: 7.3 G/DL (ref 12–16)
IMM GRANULOCYTES # BLD: 0 K/UL
IRON SATN MFR SERPL: 16 % (ref 14–50)
IRON SERPL-MCNC: 24 UG/DL (ref 37–145)
LYMPHOCYTES # BLD: 1.1 K/UL (ref 1.1–4.5)
LYMPHOCYTES NFR BLD: 31.1 % (ref 20–40)
MAGNESIUM SERPL-MCNC: 1.2 MG/DL (ref 1.6–2.6)
MCH RBC QN AUTO: 34 PG (ref 27–31)
MCHC RBC AUTO-ENTMCNC: 34 G/DL (ref 33–37)
MCV RBC AUTO: 100 FL (ref 81–99)
MONOCYTES # BLD: 0.1 K/UL (ref 0–0.9)
MONOCYTES NFR BLD: 4.1 % (ref 0–10)
NEUTROPHILS # BLD: 2.2 K/UL (ref 1.5–7.5)
NEUTS SEG NFR BLD: 63 % (ref 50–65)
PLATELET # BLD AUTO: 125 K/UL (ref 130–400)
PMV BLD AUTO: 11.5 FL (ref 9.4–12.3)
POTASSIUM SERPL-SCNC: 2.9 MMOL/L (ref 3.5–5)
POTASSIUM SERPL-SCNC: 3.6 MMOL/L (ref 3.5–5)
RBC # BLD AUTO: 2.15 M/UL (ref 4.2–5.4)
SODIUM SERPL-SCNC: 139 MMOL/L (ref 136–145)
TIBC SERPL-MCNC: 149 UG/DL (ref 250–400)
VIT B12 SERPL-MCNC: 486 PG/ML (ref 211–946)
WBC # BLD AUTO: 3.4 K/UL (ref 4.8–10.8)

## 2024-04-16 PROCEDURE — 2580000003 HC RX 258: Performed by: HOSPITALIST

## 2024-04-16 PROCEDURE — 84132 ASSAY OF SERUM POTASSIUM: CPT

## 2024-04-16 PROCEDURE — C9113 INJ PANTOPRAZOLE SODIUM, VIA: HCPCS | Performed by: HOSPITALIST

## 2024-04-16 PROCEDURE — 6360000002 HC RX W HCPCS: Performed by: HOSPITALIST

## 2024-04-16 PROCEDURE — 82746 ASSAY OF FOLIC ACID SERUM: CPT

## 2024-04-16 PROCEDURE — 6370000000 HC RX 637 (ALT 250 FOR IP): Performed by: NURSE PRACTITIONER

## 2024-04-16 PROCEDURE — 83550 IRON BINDING TEST: CPT

## 2024-04-16 PROCEDURE — 6370000000 HC RX 637 (ALT 250 FOR IP): Performed by: HOSPITALIST

## 2024-04-16 PROCEDURE — 2580000003 HC RX 258: Performed by: INTERNAL MEDICINE

## 2024-04-16 PROCEDURE — 80048 BASIC METABOLIC PNL TOTAL CA: CPT

## 2024-04-16 PROCEDURE — 85025 COMPLETE CBC W/AUTO DIFF WBC: CPT

## 2024-04-16 PROCEDURE — 2580000003 HC RX 258: Performed by: NURSE PRACTITIONER

## 2024-04-16 PROCEDURE — 82728 ASSAY OF FERRITIN: CPT

## 2024-04-16 PROCEDURE — 83735 ASSAY OF MAGNESIUM: CPT

## 2024-04-16 PROCEDURE — 83540 ASSAY OF IRON: CPT

## 2024-04-16 PROCEDURE — 82607 VITAMIN B-12: CPT

## 2024-04-16 PROCEDURE — 1200000000 HC SEMI PRIVATE

## 2024-04-16 PROCEDURE — 36415 COLL VENOUS BLD VENIPUNCTURE: CPT

## 2024-04-16 RX ORDER — CHLORDIAZEPOXIDE HYDROCHLORIDE 5 MG/1
5 CAPSULE, GELATIN COATED ORAL 3 TIMES DAILY
Status: DISCONTINUED | OUTPATIENT
Start: 2024-04-16 | End: 2024-04-17

## 2024-04-16 RX ORDER — DEXTROSE, SODIUM CHLORIDE, SODIUM LACTATE, POTASSIUM CHLORIDE, AND CALCIUM CHLORIDE 5; .6; .31; .03; .02 G/100ML; G/100ML; G/100ML; G/100ML; G/100ML
INJECTION, SOLUTION INTRAVENOUS CONTINUOUS
Status: DISCONTINUED | OUTPATIENT
Start: 2024-04-16 | End: 2024-04-18

## 2024-04-16 RX ORDER — SODIUM CHLORIDE, SODIUM LACTATE, POTASSIUM CHLORIDE, AND CALCIUM CHLORIDE .6; .31; .03; .02 G/100ML; G/100ML; G/100ML; G/100ML
1000 INJECTION, SOLUTION INTRAVENOUS ONCE
Status: COMPLETED | OUTPATIENT
Start: 2024-04-16 | End: 2024-04-16

## 2024-04-16 RX ADMIN — SODIUM CHLORIDE: 9 INJECTION, SOLUTION INTRAVENOUS at 01:03

## 2024-04-16 RX ADMIN — SODIUM CHLORIDE, POTASSIUM CHLORIDE, SODIUM LACTATE AND CALCIUM CHLORIDE 1000 ML: 600; 310; 30; 20 INJECTION, SOLUTION INTRAVENOUS at 18:23

## 2024-04-16 RX ADMIN — SODIUM CHLORIDE, PRESERVATIVE FREE 10 ML: 5 INJECTION INTRAVENOUS at 20:26

## 2024-04-16 RX ADMIN — ENOXAPARIN SODIUM 30 MG: 100 INJECTION SUBCUTANEOUS at 08:26

## 2024-04-16 RX ADMIN — PANTOPRAZOLE SODIUM 40 MG: 40 INJECTION, POWDER, FOR SOLUTION INTRAVENOUS at 08:27

## 2024-04-16 RX ADMIN — POTASSIUM CHLORIDE 10 MEQ: 7.46 INJECTION, SOLUTION INTRAVENOUS at 12:27

## 2024-04-16 RX ADMIN — POTASSIUM CHLORIDE 10 MEQ: 7.46 INJECTION, SOLUTION INTRAVENOUS at 11:05

## 2024-04-16 RX ADMIN — CHLORDIAZEPOXIDE HYDROCHLORIDE 5 MG: 5 CAPSULE ORAL at 20:26

## 2024-04-16 RX ADMIN — MAGNESIUM SULFATE HEPTAHYDRATE 2000 MG: 40 INJECTION, SOLUTION INTRAVENOUS at 05:11

## 2024-04-16 RX ADMIN — ONDANSETRON 4 MG: 2 INJECTION INTRAMUSCULAR; INTRAVENOUS at 04:45

## 2024-04-16 RX ADMIN — CHLORDIAZEPOXIDE HYDROCHLORIDE 5 MG: 5 CAPSULE ORAL at 13:56

## 2024-04-16 RX ADMIN — ONDANSETRON 4 MG: 2 INJECTION INTRAMUSCULAR; INTRAVENOUS at 20:26

## 2024-04-16 RX ADMIN — SODIUM CHLORIDE, PRESERVATIVE FREE 10 ML: 5 INJECTION INTRAVENOUS at 08:34

## 2024-04-16 RX ADMIN — Medication 100 MG: at 08:26

## 2024-04-16 RX ADMIN — POTASSIUM CHLORIDE 10 MEQ: 7.46 INJECTION, SOLUTION INTRAVENOUS at 08:25

## 2024-04-16 RX ADMIN — FOLIC ACID 1 MG: 1 TABLET ORAL at 08:27

## 2024-04-16 RX ADMIN — PANTOPRAZOLE SODIUM 40 MG: 40 INJECTION, POWDER, FOR SOLUTION INTRAVENOUS at 20:26

## 2024-04-16 RX ADMIN — MAGNESIUM SULFATE HEPTAHYDRATE 2000 MG: 40 INJECTION, SOLUTION INTRAVENOUS at 07:02

## 2024-04-16 RX ADMIN — SODIUM CHLORIDE, SODIUM LACTATE, POTASSIUM CHLORIDE, CALCIUM CHLORIDE AND DEXTROSE MONOHYDRATE: 5; 600; 310; 30; 20 INJECTION, SOLUTION INTRAVENOUS at 11:20

## 2024-04-16 RX ADMIN — CHLORDIAZEPOXIDE HYDROCHLORIDE 10 MG: 10 CAPSULE ORAL at 08:27

## 2024-04-16 RX ADMIN — POTASSIUM CHLORIDE 10 MEQ: 7.46 INJECTION, SOLUTION INTRAVENOUS at 09:48

## 2024-04-16 RX ADMIN — THERA TABS 1 TABLET: TAB at 08:27

## 2024-04-16 RX ADMIN — ONDANSETRON 4 MG: 2 INJECTION INTRAMUSCULAR; INTRAVENOUS at 11:09

## 2024-04-16 ASSESSMENT — ENCOUNTER SYMPTOMS: NAUSEA: 1

## 2024-04-16 NOTE — CARE COORDINATION
Sw shared resources with pt in consideration of rehab in which was [facility] and the [facility] for [facility]. Pt verbally consented for [facility] to come out and talk with her today.   Electronically signed by Dee Solorzano on 4/16/2024 at 10:30 AM

## 2024-04-17 LAB
ANION GAP SERPL CALCULATED.3IONS-SCNC: 15 MMOL/L (ref 7–19)
BASOPHILS # BLD: 0 K/UL (ref 0–0.2)
BASOPHILS NFR BLD: 0.4 % (ref 0–1)
BUN SERPL-MCNC: 3 MG/DL (ref 6–20)
CALCIUM SERPL-MCNC: 9 MG/DL (ref 8.6–10)
CHLORIDE SERPL-SCNC: 100 MMOL/L (ref 98–111)
CO2 SERPL-SCNC: 26 MMOL/L (ref 22–29)
CREAT SERPL-MCNC: 0.4 MG/DL (ref 0.5–0.9)
EOSINOPHIL # BLD: 0.1 K/UL (ref 0–0.6)
EOSINOPHIL NFR BLD: 2.8 % (ref 0–5)
ERYTHROCYTE [DISTWIDTH] IN BLOOD BY AUTOMATED COUNT: 19.8 % (ref 11.5–14.5)
GLUCOSE SERPL-MCNC: 95 MG/DL (ref 74–109)
HCT VFR BLD AUTO: 25.2 % (ref 37–47)
HGB BLD-MCNC: 8 G/DL (ref 12–16)
IMM GRANULOCYTES # BLD: 0 K/UL
LYMPHOCYTES # BLD: 1.1 K/UL (ref 1.1–4.5)
LYMPHOCYTES NFR BLD: 39.1 % (ref 20–40)
MAGNESIUM SERPL-MCNC: 1.8 MG/DL (ref 1.6–2.6)
MCH RBC QN AUTO: 33.6 PG (ref 27–31)
MCHC RBC AUTO-ENTMCNC: 31.7 G/DL (ref 33–37)
MCV RBC AUTO: 105.9 FL (ref 81–99)
MONOCYTES # BLD: 0.2 K/UL (ref 0–0.9)
MONOCYTES NFR BLD: 7.1 % (ref 0–10)
NEUTROPHILS # BLD: 1.4 K/UL (ref 1.5–7.5)
NEUTS SEG NFR BLD: 49.5 % (ref 50–65)
PLATELET # BLD AUTO: 129 K/UL (ref 130–400)
PMV BLD AUTO: 11.4 FL (ref 9.4–12.3)
POTASSIUM SERPL-SCNC: 3.1 MMOL/L (ref 3.5–5)
RBC # BLD AUTO: 2.38 M/UL (ref 4.2–5.4)
SODIUM SERPL-SCNC: 141 MMOL/L (ref 136–145)
WBC # BLD AUTO: 2.8 K/UL (ref 4.8–10.8)

## 2024-04-17 PROCEDURE — 1200000000 HC SEMI PRIVATE

## 2024-04-17 PROCEDURE — 83735 ASSAY OF MAGNESIUM: CPT

## 2024-04-17 PROCEDURE — 94760 N-INVAS EAR/PLS OXIMETRY 1: CPT

## 2024-04-17 PROCEDURE — 2580000003 HC RX 258: Performed by: HOSPITALIST

## 2024-04-17 PROCEDURE — 6360000002 HC RX W HCPCS: Performed by: HOSPITALIST

## 2024-04-17 PROCEDURE — 36415 COLL VENOUS BLD VENIPUNCTURE: CPT

## 2024-04-17 PROCEDURE — 80048 BASIC METABOLIC PNL TOTAL CA: CPT

## 2024-04-17 PROCEDURE — 85025 COMPLETE CBC W/AUTO DIFF WBC: CPT

## 2024-04-17 PROCEDURE — 6370000000 HC RX 637 (ALT 250 FOR IP): Performed by: NURSE PRACTITIONER

## 2024-04-17 PROCEDURE — 6370000000 HC RX 637 (ALT 250 FOR IP): Performed by: HOSPITALIST

## 2024-04-17 PROCEDURE — C9113 INJ PANTOPRAZOLE SODIUM, VIA: HCPCS | Performed by: HOSPITALIST

## 2024-04-17 RX ORDER — CHLORDIAZEPOXIDE HYDROCHLORIDE 5 MG/1
5 CAPSULE, GELATIN COATED ORAL 2 TIMES DAILY
Status: DISCONTINUED | OUTPATIENT
Start: 2024-04-17 | End: 2024-04-18

## 2024-04-17 RX ORDER — POTASSIUM CHLORIDE 7.45 MG/ML
10 INJECTION INTRAVENOUS PRN
Status: DISCONTINUED | OUTPATIENT
Start: 2024-04-17 | End: 2024-04-18 | Stop reason: HOSPADM

## 2024-04-17 RX ORDER — POTASSIUM CHLORIDE 20 MEQ/1
40 TABLET, EXTENDED RELEASE ORAL PRN
Status: DISCONTINUED | OUTPATIENT
Start: 2024-04-17 | End: 2024-04-18 | Stop reason: HOSPADM

## 2024-04-17 RX ADMIN — ENOXAPARIN SODIUM 30 MG: 100 INJECTION SUBCUTANEOUS at 09:00

## 2024-04-17 RX ADMIN — ONDANSETRON 4 MG: 2 INJECTION INTRAMUSCULAR; INTRAVENOUS at 06:36

## 2024-04-17 RX ADMIN — SODIUM CHLORIDE, PRESERVATIVE FREE 10 ML: 5 INJECTION INTRAVENOUS at 09:01

## 2024-04-17 RX ADMIN — ONDANSETRON 4 MG: 2 INJECTION INTRAMUSCULAR; INTRAVENOUS at 15:22

## 2024-04-17 RX ADMIN — PANTOPRAZOLE SODIUM 40 MG: 40 INJECTION, POWDER, FOR SOLUTION INTRAVENOUS at 20:50

## 2024-04-17 RX ADMIN — PANTOPRAZOLE SODIUM 40 MG: 40 INJECTION, POWDER, FOR SOLUTION INTRAVENOUS at 09:00

## 2024-04-17 RX ADMIN — POTASSIUM BICARBONATE 40 MEQ: 782 TABLET, EFFERVESCENT ORAL at 10:37

## 2024-04-17 RX ADMIN — CHLORDIAZEPOXIDE HYDROCHLORIDE 5 MG: 5 CAPSULE ORAL at 09:00

## 2024-04-17 RX ADMIN — SODIUM CHLORIDE, PRESERVATIVE FREE 10 ML: 5 INJECTION INTRAVENOUS at 20:50

## 2024-04-17 RX ADMIN — Medication 100 MG: at 09:00

## 2024-04-17 RX ADMIN — CHLORDIAZEPOXIDE HYDROCHLORIDE 5 MG: 5 CAPSULE ORAL at 20:50

## 2024-04-17 RX ADMIN — THERA TABS 1 TABLET: TAB at 09:00

## 2024-04-17 RX ADMIN — FOLIC ACID 1 MG: 1 TABLET ORAL at 09:00

## 2024-04-17 ASSESSMENT — ENCOUNTER SYMPTOMS: NAUSEA: 1

## 2024-04-17 NOTE — CARE COORDINATION
SW received return call from Abbey Murphy at [facility]; she stated they can accept Pt to come for her intake tomorrow at 1:00PM at their [place] office: [address] [place] [address]  Electronically signed by CHETNA Ortega on 4/17/2024 at 5:22 PM

## 2024-04-17 NOTE — CARE COORDINATION
Per Carlos at [facility], they made contact with the Pt yesterday, plan to follow-up again today; advised per attending, she is likely ready to d/c today.  Electronically signed by CHETNA Ortega on 4/17/2024 at 11:05 AM    [facility] can come eval Pt tomorrow; and they have beds available; Carlos at [facility] called Abbey at [facility] and left VM for return call.     Electronically signed by CHETNA Ortega on 4/17/2024 at 1:20 PM

## 2024-04-17 NOTE — CARE COORDINATION
Collin followed up with pt about her discharge plan of going to [facility]. Pt verbally consented and agreed that she is wanting to go to [facility] at discharge. Pt will call Abbey from [facility] tomorrow morning to have a ride to discharge tomorrow.   Electronically signed by Dee Solorzano on 4/17/2024 at 2:19 PM

## 2024-04-18 ENCOUNTER — TELEPHONE (OUTPATIENT)
Dept: INTERNAL MEDICINE | Age: 44
End: 2024-04-18

## 2024-04-18 VITALS
SYSTOLIC BLOOD PRESSURE: 88 MMHG | DIASTOLIC BLOOD PRESSURE: 65 MMHG | RESPIRATION RATE: 16 BRPM | BODY MASS INDEX: 14.09 KG/M2 | HEIGHT: 65 IN | OXYGEN SATURATION: 98 % | TEMPERATURE: 98.8 F | HEART RATE: 86 BPM | WEIGHT: 84.6 LBS

## 2024-04-18 PROBLEM — K85.90 PANCYTOPENIA ASSOCIATED WITH PANCREATITIS (HCC): Status: ACTIVE | Noted: 2024-04-18

## 2024-04-18 PROBLEM — D61.818 PANCYTOPENIA ASSOCIATED WITH PANCREATITIS (HCC): Status: ACTIVE | Noted: 2024-04-18

## 2024-04-18 LAB
ABO/RH: NORMAL
ANION GAP SERPL CALCULATED.3IONS-SCNC: 9 MMOL/L (ref 7–19)
ANTIBODY SCREEN: NORMAL
BASOPHILS # BLD: 0 K/UL (ref 0–0.2)
BASOPHILS NFR BLD: 0.7 % (ref 0–1)
BUN SERPL-MCNC: 6 MG/DL (ref 6–20)
CALCIUM SERPL-MCNC: 8.9 MG/DL (ref 8.6–10)
CHLORIDE SERPL-SCNC: 101 MMOL/L (ref 98–111)
CO2 SERPL-SCNC: 28 MMOL/L (ref 22–29)
CREAT SERPL-MCNC: 0.4 MG/DL (ref 0.5–0.9)
EOSINOPHIL # BLD: 0.1 K/UL (ref 0–0.6)
EOSINOPHIL NFR BLD: 1.3 % (ref 0–5)
ERYTHROCYTE [DISTWIDTH] IN BLOOD BY AUTOMATED COUNT: 20.3 % (ref 11.5–14.5)
GLUCOSE SERPL-MCNC: 88 MG/DL (ref 74–109)
HCT VFR BLD AUTO: 21.2 % (ref 37–47)
HCT VFR BLD AUTO: 23.6 % (ref 37–47)
HGB BLD-MCNC: 6.8 G/DL (ref 12–16)
HGB BLD-MCNC: 7.4 G/DL (ref 12–16)
IMM GRANULOCYTES # BLD: 0.3 K/UL
LYMPHOCYTES # BLD: 1.5 K/UL (ref 1.1–4.5)
LYMPHOCYTES NFR BLD: 33.4 % (ref 20–40)
MCH RBC QN AUTO: 34.9 PG (ref 27–31)
MCHC RBC AUTO-ENTMCNC: 32.1 G/DL (ref 33–37)
MCV RBC AUTO: 108.7 FL (ref 81–99)
MONOCYTES # BLD: 0.6 K/UL (ref 0–0.9)
MONOCYTES NFR BLD: 12.3 % (ref 0–10)
NEUTROPHILS # BLD: 2.1 K/UL (ref 1.5–7.5)
NEUTS SEG NFR BLD: 45 % (ref 50–65)
PLATELET # BLD AUTO: 133 K/UL (ref 130–400)
PMV BLD AUTO: 11 FL (ref 9.4–12.3)
POTASSIUM SERPL-SCNC: 4.2 MMOL/L (ref 3.5–5)
RBC # BLD AUTO: 1.95 M/UL (ref 4.2–5.4)
SODIUM SERPL-SCNC: 138 MMOL/L (ref 136–145)
WBC # BLD AUTO: 4.6 K/UL (ref 4.8–10.8)

## 2024-04-18 PROCEDURE — 94760 N-INVAS EAR/PLS OXIMETRY 1: CPT

## 2024-04-18 PROCEDURE — 86900 BLOOD TYPING SEROLOGIC ABO: CPT

## 2024-04-18 PROCEDURE — 36415 COLL VENOUS BLD VENIPUNCTURE: CPT

## 2024-04-18 PROCEDURE — 85025 COMPLETE CBC W/AUTO DIFF WBC: CPT

## 2024-04-18 PROCEDURE — 6370000000 HC RX 637 (ALT 250 FOR IP): Performed by: HOSPITALIST

## 2024-04-18 PROCEDURE — 6370000000 HC RX 637 (ALT 250 FOR IP): Performed by: NURSE PRACTITIONER

## 2024-04-18 PROCEDURE — 86901 BLOOD TYPING SEROLOGIC RH(D): CPT

## 2024-04-18 PROCEDURE — 2580000003 HC RX 258: Performed by: HOSPITALIST

## 2024-04-18 PROCEDURE — 85014 HEMATOCRIT: CPT

## 2024-04-18 PROCEDURE — 86850 RBC ANTIBODY SCREEN: CPT

## 2024-04-18 PROCEDURE — C9113 INJ PANTOPRAZOLE SODIUM, VIA: HCPCS | Performed by: HOSPITALIST

## 2024-04-18 PROCEDURE — 80048 BASIC METABOLIC PNL TOTAL CA: CPT

## 2024-04-18 PROCEDURE — 6360000002 HC RX W HCPCS: Performed by: HOSPITALIST

## 2024-04-18 PROCEDURE — 85018 HEMOGLOBIN: CPT

## 2024-04-18 RX ORDER — THIAMINE MONONITRATE (VIT B1) 100 MG
100 TABLET ORAL DAILY
Qty: 30 TABLET | Refills: 0 | Status: SHIPPED | OUTPATIENT
Start: 2024-04-19

## 2024-04-18 RX ORDER — GUAIFENESIN/DEXTROMETHORPHAN 100-10MG/5
5 SYRUP ORAL EVERY 4 HOURS PRN
Qty: 120 ML | Refills: 0 | Status: SHIPPED | OUTPATIENT
Start: 2024-04-18 | End: 2024-04-28

## 2024-04-18 RX ORDER — MULTIVITAMIN WITH IRON
1 TABLET ORAL DAILY
Qty: 30 TABLET | Refills: 0 | Status: SHIPPED | OUTPATIENT
Start: 2024-04-19

## 2024-04-18 RX ORDER — GUAIFENESIN/DEXTROMETHORPHAN 100-10MG/5
5 SYRUP ORAL EVERY 4 HOURS PRN
Status: DISCONTINUED | OUTPATIENT
Start: 2024-04-18 | End: 2024-04-18 | Stop reason: HOSPADM

## 2024-04-18 RX ORDER — FOLIC ACID 1 MG/1
1 TABLET ORAL DAILY
Qty: 30 TABLET | Refills: 0 | Status: SHIPPED | OUTPATIENT
Start: 2024-04-19

## 2024-04-18 RX ADMIN — SODIUM CHLORIDE, PRESERVATIVE FREE 10 ML: 5 INJECTION INTRAVENOUS at 08:31

## 2024-04-18 RX ADMIN — ENOXAPARIN SODIUM 30 MG: 100 INJECTION SUBCUTANEOUS at 08:31

## 2024-04-18 RX ADMIN — PANTOPRAZOLE SODIUM 40 MG: 40 INJECTION, POWDER, FOR SOLUTION INTRAVENOUS at 08:31

## 2024-04-18 RX ADMIN — THERA TABS 1 TABLET: TAB at 08:31

## 2024-04-18 RX ADMIN — FOLIC ACID 1 MG: 1 TABLET ORAL at 08:31

## 2024-04-18 RX ADMIN — Medication 100 MG: at 08:31

## 2024-04-18 RX ADMIN — CHLORDIAZEPOXIDE HYDROCHLORIDE 5 MG: 5 CAPSULE ORAL at 08:31

## 2024-04-18 NOTE — TELEPHONE ENCOUNTER
Care Transitions Initial Follow Up Call    Outreach made within 2 business days of discharge: Yes  Workman's comp claim?No  Patient: Kandy Mercado   Patient : 1980   MRN: [id number]    Reason for Admission: nausea/vomiting  Discharge Date: 24       Spoke with: The patients phone has been disconnected.     Discharge department/facility: [facility]      Follow Up  Future Appointments   Date Time Provider Department Center   2024  9:00 AM Anita Romero, APRN - CNP [facility]       Lori Eduardo MA

## 2024-04-18 NOTE — DISCHARGE INSTR - DIET

## 2024-04-18 NOTE — DISCHARGE SUMMARY
General Adult HPI





- General


Chief complaint: ENT


Stated complaint: Nosebleed


Time Seen by Provider: 18 15:55


Source: patient, EMS, RN notes reviewed


Mode of arrival: EMS


Limitations: no limitations





- History of Present Illness


Initial comments: 





60-year-old female no significant past medical history presents for evaluation 

of nosebleed.  Patient has had nosebleeds over the past 4 days.  These have 

resolved without treatment.  Today she developed worsening right sided 

epistaxis with several large clots.  Patient denies any trauma.  She does have 

a chronic history of migraine headache and she takes aspirin-containing 

products for her chronic headaches.  No other blood thinners noted.  No 

rhinorrhea or URI symptoms.





- Related Data


 Home Medications











 Medication  Instructions  Recorded  Confirmed


 


Baclofen [Lioresal] 20 mg PO HS 18


 


FLUoxetine HCL [PROzac] 20 mg PO DAILY 18


 


Levothyroxine Sodium [Synthroid] 125 mcg PO DAILY 18


 


Propranolol HCl [Inderal LA] 80 mg PO HS 18


 


Rizatriptan Benzoate [Maxalt MLT] 10 mg PO DAILY PRN 18








 Previous Rx's











 Medication  Instructions  Recorded


 


Amoxic-Pot Clav 875-125Mg 1 tab PO Q12HR #10 tablet 18





[Augmentin 875-125]  











 Allergies











Allergy/AdvReac Type Severity Reaction Status Date / Time


 


latex Allergy  Rash/Hives Verified 18 16:19


 


Sulfa (Sulfonamide Allergy  Unknown Verified 18 16:19





Antibiotics)   Childhood  














Review of Systems


ROS Statement: 


Those systems with pertinent positive or pertinent negative responses have been 

documented in the HPI.





ROS Other: All systems not noted in ROS Statement are negative.





Past Medical History


Past Medical History: Thyroid Disorder


History of Any Multi-Drug Resistant Organisms: None Reported


Past Surgical History: Bariatric Surgery,  Section, Cholecystectomy, 

Tonsillectomy


Additional Past Surgical History / Comment(s): gastric bypass, sinus


Past Psychological History: Depression


Smoking Status: Current every day smoker


Past Alcohol Use History: None Reported


Past Drug Use History: None Reported





General Exam


Limitations: no limitations


General appearance: alert, in no apparent distress


Head exam: Present: atraumatic, normocephalic


Eye exam: Present: normal appearance, PERRL


ENT exam: Present: other (Epistaxis primarily from the right nare, she has some 

posterior oropharynx hemorrhage with no visualized clots.)


Neck exam: Present: normal inspection.  Absent: tenderness, meningismus


Respiratory exam: Present: normal lung sounds bilaterally.  Absent: respiratory 

distress, wheezes


Cardiovascular Exam: Present: regular rate, normal rhythm


GI/Abdominal exam: Present: soft.  Absent: distended, tenderness


Extremities exam: Present: normal inspection, normal capillary refill.  Absent: 

pedal edema


Neurological exam: Present: alert, oriented X3, CN II-XII intact.  Absent: 

motor sensory deficit


Psychiatric exam: Present: normal affect, normal mood


Skin exam: Present: warm, dry, intact.  Absent: cyanosis, diaphoretic, pallor





Course


 Vital Signs











  18





  15:45


 


Temperature 98.7 F


 


Pulse Rate 68


 


Respiratory 16





Rate 


 


Blood Pressure 156/97


 


O2 Sat by Pulse 97





Oximetry 














Medical Decision Making





- Medical Decision Making





Procedure note: Nasal packing: Nose is irrigated with Afrin nasal spray, nasal 

packing is applied with bacitracin ointment.  Patient tolerates procedure well.

  Hemostasis is achieved.











60-year-old female presenting for nosebleed.  She has had some nasal 

congestion.  On exam her nasal turbinates are significantly swollen.  No single 

bleeding vessel is seen on exam.  Initial infiltration of Afrin nasal spray and 

nasal clamp is unsuccessful.  Packing is applied.  Hemostasis, she continues to 

bleed out of both nostrils.  Rhino Rocket is placed on the right and Merocel 

packing is placed on the left.  There is no posterior oropharyngeal bleeding on 

reevaluation. Patient will be prescribed antibiotics and will follow up with 

ENT.





Disposition


Clinical Impression: 


 Epistaxis





Disposition: HOME SELF-CARE


Condition: Good


Instructions:  Nosebleed (ED)


Prescriptions: 


Amoxic-Pot Clav 875-125Mg [Augmentin 875-125] 1 tab PO Q12HR #10 tablet


Is patient prescribed a controlled substance at d/c from ED?: No


Referrals: 


Byron Mckeon DO [Primary Care Provider] - 1-2 days


Eusebio Rivera MD [STAFF PHYSICIAN] - 1-2 days


Time of Disposition: 16:30
 Liver: Diffuse hyperechoic echotexture.  Normal surface contour.  No lesions. - Main portal vein: Normal hepatopetal flow.  Biliary: No cholelithiasis or demonstrated gallbladder sludge.  No wall thickening.  Negative sonographic Fitzpatrick's sign. -Common bile duct measures 4.0 mm.  Right Kidney: No mass, calculus, or hydronephrosis.  Aorta:  Visualized portion without aneurysmal dilatation.  IVC: .  No abnormality on limited grey scale image.  Other: No ascites.        Fatty liver.  Otherwise, unremarkable ultrasound of the right upper quadrant.    ______________________________________ Electronically signed by: JAMMIE PARSONS D.O. Date:     04/15/2024 Time:    10:23     CT ABDOMEN PELVIS W IV CONTRAST Additional Contrast? Oral    Result Date: 4/14/2024  EXAM:  CT OF THE ABDOMEN AND PELVIS WITH CONTRAST  History:  Abdominal pain  Technique:  5 mm CT of the abdomen and pelvis following intravenous contrast  FINDINGS:  The lung bases are clear.  No significant liver abnormality.  The adrenals, pancreas and spleen are unremarkable.  The stomach and hiatus are unremarkable.The gallbladder appears normal.  Kidneys and proximal collecting system are unremarkable.  The appendix is normal.  Bowel loops demonstrate normal caliber.  No inflamatory change seen in the mesentery or retroperitoneum.  Minor atherosclerotic calcification of the aorta without aneurysm.  Pelvic genitourinary structures appear normal.  Pelvic bowel loops are unremarkable.  No inflammatory change in the pelvic fat.  No acute abnormality of the abdominal or pelvic skeleton.      Impression: 1.  No inflammatory process, bowel or urinary obstruction.  No acute findings of the abdomen and pelvis.  All CT scans are performed using dose optimization techniques as appropriate to the performed exam and include at least one of the following: Automated exposure control, adjustment of the mA and/or kV according to size, and the use of iterative reconstruction

## 2024-04-18 NOTE — PROGRESS NOTES
[facility]      Patient:  Kandy Mercado  YOB: 1980  Date of Service: 4/16/2024  MRN: [id number]   Acct: [id number]   Primary Care Physician: No primary care provider on file.  Advance Directive: Full Code  Admit Date: 4/14/2024       Hospital Day: 2  Portions of this note have been copied forward, however, changed to reflect the most current clinical status of this patient.    CHIEF COMPLAINT N/V/abdominal pain    SUBJECTIVE:  Nausea has improved. Abdominal pain improved. No withdrawal symptoms on scheduled librium. Asymptomatic hypotension.     CUMULATIVE HOSPITAL STAY:  The patient is a 42 yo female with a PMH of chronic pain and depression who presented to [facility] ED on 4/14/2024 with c/o N/V and abdominal pain. She reported that she had progressive problems with N/V and generalized abdominal pain for ~3-4 days prior to admission. She denied fever or diarrhea. Upon admission, she denied ETOH use, however, after admission, her family found numerous empty bottles hard liquor under her bed-she does now admit to drinking ETOH and having \"tremors\" occasionally when she doesn't drink, despite not admitting to daily use. She denied a previous hx of pancreatitis. Further ED work-up revealed initial lactic acid 3.7.  CT of the abdomen pelvis with IV contrast revealed no inflammatory process, bowel or urinary obstruction.  Lipase 464, , K3.1, BUN 32 creatinine 0.7.  Alk phos 169, ALT-13, AST-50, VBG-pH 7.5.  WBC 14.6, H&H 11.1/32.4 with a platelet count of 185.  Procalcitonin 1.08.  The patient was admitted to hospital medicine for acute pancreatitis with a GI consult.  During the evening, family reported they found numerous liquor bottles under her bed that was consumed over an unknown period of time.  The patient was placed on CIWA protocol and scheduled Ativan.  This is subsequently been changed to scheduled Librium-dosing and frequency decreased to 5 mg TID.  Ethanol level drawn upon 
  Physician Progress Note      PATIENT:               KENIA ARNOLD  CSN #:                  [id number]  :                       1980  ADMIT DATE:       2024 6:23 PM  DISCH DATE:  RESPONDING  PROVIDER #:        RHEA QUISPE          QUERY TEXT:    Patient admitted with pancreatitis and history of Alcohol Abuse.  Pt. is noted   to have decreased WBC, decreased RBC and decreased platelets.  If possible,   please document in progress notes and discharge summary if you are evaluating   and/or treating any of the following:      The medical record reflects the following:  Risk Factors: ETOH Abuse,  Clinical Indicators: LABS:  WBC: 3.4 RBC: 2.15, PLATS: 125, on admission: WBC:   4.6, RBC: 3.25, PLATS: 185  Treatment: Serial labs,    Inocencia Boland, RN-BSN, CRCR  Clinical   [email or web address]  [facility]  Options provided:  -- Pancytopenia  -- Decreased WBC, decreased RBCs and decreased platelets are not clinically   significant  -- Other - I will add my own diagnosis  -- Disagree - Not applicable / Not valid  -- Disagree - Clinically unable to determine / Unknown  -- Refer to Clinical Documentation Reviewer    PROVIDER RESPONSE TEXT:     to ETOH abuse    Query created by: Andria Boland on 2024 1:46 PM      Electronically signed by:  RHEA QUISPE 2024 10:37 AM          
  Physician Progress Note      PATIENT:               KENIA ARNOLD  CSN #:                  [id number]  :                       1980  ADMIT DATE:       2024 6:23 PM  DISCH DATE:  RESPONDING  PROVIDER #:        Jason Cooper MD          QUERY TEXT:    Patient admitted with Pancreatitis. Noted to have Severe Malnutrition   documented by Dietary on  and .  If possible, please document in   progress notes and discharge summary if you are evaluating and /or treating   any of the following:    The medical record reflects the following:  Risk Factors: ETOH Abuse, Pancreatitis  Clinical Indicators: Severe Malnutrition noted by dietary on 24 and   24.  Dietary notes \"inadequate protein energy intake, altered GI   function, pain as evidenced by poor intake prior to admission, BMI, severe   loss of subcutaneous fat, severe muscle loss, weight loss greater than or   equal to 5% in 1 month. \"  Treatment: Dietary consult, PO intake increse    ASPEN Criteria:    https://aspenjournals.onlinelibrary.agosto.com/doi/full/10.1177/421056296688559  5  Options provided:  -- Protein calorie malnutrition severe  -- Other - I will add my own diagnosis  -- Disagree - Not applicable / Not valid  -- Disagree - Clinically unable to determine / Unknown  -- Refer to Clinical Documentation Reviewer    PROVIDER RESPONSE TEXT:    This patient has severe protein calorie malnutrition.    Query created by: Andria Boland on 2024 2:12 PM      Electronically signed by:  Jason Cooper MD 2024 11:25 AM          
4 Eyes Skin Assessment     NAME:  Kandy Mercado  YOB: 1980  MEDICAL RECORD NUMBER:  [id number]    The patient is being assessed for  Admission    I agree that at least one RN has performed a thorough Head to Toe Skin Assessment on the patient. ALL assessment sites listed below have been assessed.      Areas assessed by both nurses:    Head, Face, Ears, Shoulders, Back, Chest, Arms, Elbows, Hands, Sacrum. Buttock, Coccyx, Ischium, Legs. Feet and Heels, and Under Medical Devices         Does the Patient have a Wound? No noted wound(s)       Alen Prevention initiated by RN: No  Wound Care Orders initiated by RN: No    Pressure Injury (Stage 3,4, Unstageable, DTI, NWPT, and Complex wounds) if present, place Wound referral order by RN under : No    New Ostomies, if present place, Ostomy referral order under : No     Nurse 1 eSignature: Electronically signed by Tere James RN on 4/15/24 at 3:20 AM CDT    **SHARE this note so that the co-signing nurse can place an eSignature**    Nurse 2 eSignature: Electronically signed by Savanna Batista LPN on 4/15/24 at 3:21 AM CDT     
CLINICAL PHARMACY NOTE: MEDS TO BEDS    Total # of Prescriptions Filled: 4   The following medications were delivered to the patient:  Discharge Medication List as of 4/18/2024 10:02 AM        START taking these medications    Details   folic acid (FOLVITE) 1 MG tablet Take 1 tablet by mouth daily, Disp-30 tablet, R-0Normal      Multiple Vitamin (MULTIVITAMIN) TABS tablet Take 1 tablet by mouth daily, Disp-30 tablet, R-0Normal      thiamine mononitrate (THIAMINE) 100 MG tablet Take 1 tablet by mouth daily, Disp-30 tablet, R-0Normal      guaiFENesin-dextromethorphan (ROBITUSSIN DM) 100-10 MG/5ML syrup Take 5 mLs by mouth every 4 hours as needed for Cough, Disp-120 mL, R-0Normal               Additional Documentation:  Delivered to patients room and handed to patient. No copay  
Called to advise of consult spoke Dr. Freedman.  
Comprehensive Nutrition Assessment    Type and Reason for Visit:  Initial, Positive Nutrition Screen    Nutrition Recommendations/Plan:   Add severe malnutrition to problem list.     Malnutrition Assessment:  Malnutrition Status:  Severe malnutrition (04/15/24 1320)    Context:  Social/Environmental Circumstances     Findings of the 6 clinical characteristics of malnutrition:  Energy Intake:  Mild decrease in energy intake (Comment)  Weight Loss:  5% over 1 month     Body Fat Loss:  Severe body fat loss Buccal region   Muscle Mass Loss:  Severe muscle mass loss Temples (temporalis)  Fluid Accumulation:  No significant fluid accumulation     Strength:  Not Performed    Nutrition Assessment:    +NS for wt loss, decreased appetite. Pt also screened for low BMI: 13.8. Pt currently NPO, awaiting GI eval. Pt not interactive during visit, but mother present and interviewed. She described pt as depressed and reported pt does not eat much, that she takes care of her kids before herself. Reported pt weighed 88# 1 month ago and used to weigh 110#. Most recent wt hx: 9/15/22, 113.4#. Observed severe wasting to temple, buccal regions. Will follow for diet advancement.    Nutrition Related Findings:    BM 4/14. Na 133, BUN 23, , , AST 44, lipase 464, glu 145, 106.         Current Nutrition Intake & Therapies:    Average Meal Intake: NPO  Diet NPO Exceptions are: Ice Chips, Sips of Water with Meds    Anthropometric Measures:  Height: 165.1 cm (5' 5\")  Ideal Body Weight (IBW): 125 lbs (57 kg)    Current Body Weight: 37.6 kg (82 lb 14.3 oz), 66.3 % IBW.    Current BMI (kg/m2): 13.8  Usual Body Weight: 39.9 kg (88 lb) (1 month ago)  % Weight Change (Calculated): -5.8  BMI Categories: Underweight (BMI less than 18.5)    Estimated Daily Nutrient Needs:  Energy Requirements Based On: Kcal/kg  Weight Used for Energy Requirements: Current  Energy (kcal/day): 3438-5040  Weight Used for Protein Requirements: 
Comprehensive Nutrition Assessment    Type and Reason for Visit:  Reassess    Nutrition Recommendations/Plan:   Continue to monitor po intake, meal and ONS tolerance     Malnutrition Assessment:  Malnutrition Status:  Severe malnutrition (04/18/24 0945)    Context:  Social/Environmental Circumstances     Findings of the 6 clinical characteristics of malnutrition:  Energy Intake:  Mild decrease in energy intake (Comment)  Weight Loss:  5% over 1 month     Body Fat Loss:  Severe body fat loss Orbital, Buccal region   Muscle Mass Loss:  Severe muscle mass loss Temples (temporalis), Clavicles (pectoralis & deltoids)  Fluid Accumulation:  No significant fluid accumulation Extremities   Strength:  Not Performed    Nutrition Assessment:    Patient's diet  has been advanced to Regular GI [place].  Pt eating hot cereal at time of visit.  Not having difficulties tolerating this.  did have some issues with dinner last night.  Did state \"Diarrhea has stopped.\"    Nutrition Related Findings:    Na- 138.  K+ 4.2 Wound Type: None       Current Nutrition Intake & Therapies:    Average Meal Intake: 26-50%  Average Supplements Intake: 26-50%, 51-75%  ADULT DIET; Regular; GI [place] (GERD/Peptic Ulcer); likes oatmeal, yogurt.  Does not Grits/Cream of Wheat  ADULT ORAL NUTRITION SUPPLEMENT; Lunch, Dinner; Standard High Calorie/High Protein Oral Supplement    Anthropometric Measures:  Height: 165.1 cm (5' 5\")  Ideal Body Weight (IBW): 125 lbs (57 kg)    Admission Body Weight: 37.6 kg (82 lb 14.3 oz)  Current Body Weight: 38.4 kg (84 lb 10.5 oz), 67.9 % IBW. Weight Source: Standing Scale  Current BMI (kg/m2): 14.1  Usual Body Weight: 39.9 kg (88 lb) (1 month ago)  % Weight Change (Calculated): -5.8  BMI Categories: Underweight (BMI less than 18.5)    Estimated Daily Nutrient Needs:  Energy Requirements Based On: Kcal/kg  Weight Used for Energy Requirements: Current  Energy (kcal/day): 8742-3744 kcals (30-35 kcals/kg)  Weight Used for 
Kandy Mercado arrived to room # 316.   Presented with: pancreatitis  Mental Status: Patient is oriented, alert, coherent, logical, thought processes intact, and able to concentrate and follow conversation.   Vitals:    04/14/24 2121   BP: 96/73   Pulse: (!) 103   Resp: 18   Temp: 99.3 °F (37.4 °C)   SpO2: 98%     Patient safety contract and falls prevention contract reviewed with patient Yes.  Oriented Patient to room.  Call light within reach. Yes.  Needs, issues or concerns expressed at this time: no.      Electronically signed by Tere James RN on 4/15/2024 at 3:18 AM  
Current  Protein (g/day): 56  Method Used for Fluid Requirements: 1 ml/kcal  Fluid (ml/day): 3423-4280    Nutrition Diagnosis:   Severe malnutrition, In context of social or environmental circumstances related to inadequate protein-energy intake, altered GI function, pain as evidenced by poor intake prior to admission, BMI, severe loss of subcutaneous fat, severe muscle loss, weight loss greater than or equal to 5% in 1 month, Criteria as identified in malnutrition assessment, nausea    Nutrition Interventions:   Food and/or Nutrient Delivery: Continue Current Diet, Continue Oral Nutrition Supplement  Nutrition Education/Counseling: No recommendation at this time  Coordination of Nutrition Care: Continue to monitor while inpatient  Plan of Care discussed with: pt    Goals:  Previous Goal Met: Progressing toward Goal(s)  Goals: Meet at least 75% of estimated needs, PO intake 50% or greater       Nutrition Monitoring and Evaluation:   Behavioral-Environmental Outcomes: None Identified  Food/Nutrient Intake Outcomes: Diet Advancement/Tolerance, Food and Nutrient Intake, Supplement Intake  Physical Signs/Symptoms Outcomes: Biochemical Data, Nausea or Vomiting, Fluid Status or Edema, Weight, Skin    Discharge Planning:    Too soon to determine     Lynne Plummer, MS, RD, LD  Contact: [phone]    
Mucous membranes are dry.   Eyes:      Pupils: Pupils are equal, round, and reactive to light.   Cardiovascular:      Rate and Rhythm: Normal rate and regular rhythm.      Pulses: Normal pulses.      Heart sounds: Normal heart sounds.   Pulmonary:      Effort: Pulmonary effort is normal.      Breath sounds: Normal breath sounds.   Abdominal:      General: Abdomen is flat. Bowel sounds are normal.      Palpations: Abdomen is soft.      Tenderness: There is no abdominal tenderness.   Musculoskeletal:         General: Normal range of motion.   Skin:     General: Skin is warm and dry.      Coloration: Skin is pale.   Neurological:      General: No focal deficit present.      Motor: Weakness present.   Psychiatric:         Behavior: Behavior is cooperative.             Medications:      dextrose 5% in lactated ringers 75 mL/hr at 04/17/24 0910    sodium chloride 150 mL/hr at 04/16/24 0103      chlordiazePOXIDE  5 mg Oral BID    thiamine mononitrate  100 mg Oral Daily    folic acid  1 mg Oral Daily    multivitamin  1 tablet Oral Daily    sodium chloride flush  5-40 mL IntraVENous 2 times per day    enoxaparin  30 mg SubCUTAneous Daily    pantoprazole  40 mg IntraVENous BID     potassium chloride **OR** potassium alternative oral replacement **OR** potassium chloride, LORazepam **OR** LORazepam **OR** LORazepam **OR** LORazepam **OR** LORazepam **OR** LORazepam **OR** LORazepam **OR** LORazepam, sodium chloride flush, sodium chloride, magnesium sulfate, sodium phosphates 15 mmol in sodium chloride 0.9 % 250 mL IVPB, morphine **OR** [DISCONTINUED] morphine, ondansetron  ADULT DIET; Regular; GI [place] (GERD/Peptic Ulcer); likes oatmeal, yogurt.  Does not Grits/Cream of Wheat  ADULT ORAL NUTRITION SUPPLEMENT; Lunch, Dinner; Standard High Calorie/High Protein Oral Supplement     Lab and other Data:     Recent Labs     04/15/24  0011 04/16/24  0352 04/17/24  0355   WBC 4.5* 3.4* 2.8*   HGB 9.1* 7.3* 8.0*    125* 129* 
*      DVT Prophylaxis: Lovenox    GI prophylaxis:  Protonix    Disposition: [facility] [place], 4/15/2024 12:08 PM

## 2024-04-18 NOTE — PLAN OF CARE
Problem: Discharge Planning  Goal: Discharge to home or other facility with appropriate resources  4/15/2024 1105 by Maribel Lee RN  Outcome: Progressing  4/15/2024 0308 by Tere James RN  Outcome: Progressing  Flowsheets (Taken 4/15/2024 0306)  Discharge to home or other facility with appropriate resources:   Identify barriers to discharge with patient and caregiver   Arrange for needed discharge resources and transportation as appropriate   Identify discharge learning needs (meds, wound care, etc)   Arrange for interpreters to assist at discharge as needed   Refer to discharge planning if patient needs post-hospital services based on physician order or complex needs related to functional status, cognitive ability or social support system     Problem: Pain  Goal: Verbalizes/displays adequate comfort level or baseline comfort level  4/15/2024 1105 by Maribel Lee RN  Outcome: Progressing  4/15/2024 0308 by Tere James RN  Outcome: Progressing     Problem: Safety - Adult  Goal: Free from fall injury  4/15/2024 1105 by Maribel Lee RN  Outcome: Progressing  4/15/2024 0308 by Tere James RN  Outcome: Progressing     Problem: ABCDS Injury Assessment  Goal: Absence of physical injury  4/15/2024 1105 by Maribel Lee RN  Outcome: Progressing  4/15/2024 0308 by Tere James RN  Outcome: Progressing     Problem: Neurosensory - Adult  Goal: Achieves stable or improved neurological status  4/15/2024 1105 by Maribel Lee RN  Outcome: Progressing  Flowsheets (Taken 4/15/2024 0329 by Tere James RN)  Achieves stable or improved neurological status:   Assess for and report changes in neurological status   Initiate measures to prevent increased intracranial pressure   Maintain blood pressure and fluid volume within ordered parameters to optimize cerebral perfusion and minimize risk of hemorrhage   Monitor temperature, glucose, and sodium. Initiate appropriate interventions as ordered  4/15/2024 
  Problem: Discharge Planning  Goal: Discharge to home or other facility with appropriate resources  4/16/2024 0018 by Cristela Cruz RN  Outcome: Progressing  4/15/2024 1105 by Maribel Lee RN  Outcome: Progressing     Problem: Pain  Goal: Verbalizes/displays adequate comfort level or baseline comfort level  4/16/2024 0018 by Cristela Cruz RN  Outcome: Progressing  4/15/2024 1105 by Maribel Lee RN  Outcome: Progressing     Problem: Safety - Adult  Goal: Free from fall injury  4/16/2024 0018 by Cristela Cruz RN  Outcome: Progressing  4/15/2024 1105 by Maribel Lee RN  Outcome: Progressing     Problem: ABCDS Injury Assessment  Goal: Absence of physical injury  4/16/2024 0018 by rCistela Cruz RN  Outcome: Progressing  4/15/2024 1105 by Maribel Lee RN  Outcome: Progressing     Problem: Neurosensory - Adult  Goal: Achieves stable or improved neurological status  4/16/2024 0018 by Cristela Cruz RN  Outcome: Progressing  4/15/2024 1105 by Maribel Lee RN  Outcome: Progressing  Flowsheets (Taken 4/15/2024 0329 by Tere James RN)  Achieves stable or improved neurological status:   Assess for and report changes in neurological status   Initiate measures to prevent increased intracranial pressure   Maintain blood pressure and fluid volume within ordered parameters to optimize cerebral perfusion and minimize risk of hemorrhage   Monitor temperature, glucose, and sodium. Initiate appropriate interventions as ordered  Goal: Absence of seizures  4/16/2024 0018 by Cristela Cruz RN  Outcome: Progressing  4/15/2024 1105 by Maribel Lee RN  Outcome: Progressing  Flowsheets (Taken 4/15/2024 0329 by Tere James RN)  Absence of seizures:   Monitor for seizure activity.  If seizure occurs, document type and location of movements and any associated apnea   If seizure occurs, turn head to side and suction secretions as needed   Administer anticonvulsants as ordered   Support airway/breathing, administer 
  Problem: Discharge Planning  Goal: Discharge to home or other facility with appropriate resources  4/16/2024 1101 by Mark Mitchell  Outcome: Progressing  4/16/2024 0018 by Cristela Cruz, RN  Outcome: Progressing     Problem: Metabolic/Fluid and Electrolytes - Adult  Goal: Electrolytes maintained within normal limits  4/16/2024 1101 by Mark Mitchell  Outcome: Progressing  4/16/2024 0018 by Cristela Cruz, RN  Outcome: Progressing     Problem: Nutrition Deficit:  Goal: Optimize nutritional status  4/16/2024 1101 by Mark Mitchell  Outcome: Progressing  4/16/2024 0018 by Cristela Cruz, RN  Outcome: Progressing     
  Problem: Discharge Planning  Goal: Discharge to home or other facility with appropriate resources  Outcome: Progressing     Problem: Pain  Goal: Verbalizes/displays adequate comfort level or baseline comfort level  Outcome: Progressing     Problem: Safety - Adult  Goal: Free from fall injury  Outcome: Progressing     Problem: ABCDS Injury Assessment  Goal: Absence of physical injury  Outcome: Progressing     Problem: Neurosensory - Adult  Goal: Achieves stable or improved neurological status  Outcome: Progressing  Goal: Absence of seizures  Outcome: Progressing  Goal: Remains free of injury related to seizures activity  Outcome: Progressing  Goal: Achieves maximal functionality and self care  Outcome: Progressing     Problem: Respiratory - Adult  Goal: Achieves optimal ventilation and oxygenation  Outcome: Progressing     Problem: Cardiovascular - Adult  Goal: Maintains optimal cardiac output and hemodynamic stability  Outcome: Progressing  Goal: Absence of cardiac dysrhythmias or at baseline  Outcome: Progressing     Problem: Skin/Tissue Integrity - Adult  Goal: Skin integrity remains intact  Outcome: Progressing  Goal: Incisions, wounds, or drain sites healing without S/S of infection  Outcome: Progressing  Goal: Oral mucous membranes remain intact  Outcome: Progressing     Problem: Musculoskeletal - Adult  Goal: Return mobility to safest level of function  Outcome: Progressing  Goal: Maintain proper alignment of affected body part  Outcome: Progressing  Goal: Return ADL status to a safe level of function  Outcome: Progressing     Problem: Gastrointestinal - Adult  Goal: Minimal or absence of nausea and vomiting  Outcome: Progressing  Goal: Maintains or returns to baseline bowel function  Outcome: Progressing  Goal: Maintains adequate nutritional intake  Outcome: Progressing  Goal: Establish and maintain optimal ostomy function  Outcome: Progressing     Problem: Genitourinary - Adult  Goal: Absence of urinary 
  Problem: Discharge Planning  Goal: Discharge to home or other facility with appropriate resources  Outcome: Progressing  Flowsheets (Taken 4/15/2024 0306)  Discharge to home or other facility with appropriate resources:   Identify barriers to discharge with patient and caregiver   Arrange for needed discharge resources and transportation as appropriate   Identify discharge learning needs (meds, wound care, etc)   Arrange for interpreters to assist at discharge as needed   Refer to discharge planning if patient needs post-hospital services based on physician order or complex needs related to functional status, cognitive ability or social support system     Problem: Pain  Goal: Verbalizes/displays adequate comfort level or baseline comfort level  Outcome: Progressing     Problem: Safety - Adult  Goal: Free from fall injury  Outcome: Progressing     Problem: ABCDS Injury Assessment  Goal: Absence of physical injury  Outcome: Progressing     Problem: Neurosensory - Adult  Goal: Achieves stable or improved neurological status  Outcome: Progressing  Goal: Absence of seizures  Outcome: Progressing  Goal: Remains free of injury related to seizures activity  Outcome: Progressing  Goal: Achieves maximal functionality and self care  Outcome: Progressing     Problem: Respiratory - Adult  Goal: Achieves optimal ventilation and oxygenation  Outcome: Progressing     Problem: Cardiovascular - Adult  Goal: Maintains optimal cardiac output and hemodynamic stability  Outcome: Progressing  Goal: Absence of cardiac dysrhythmias or at baseline  Outcome: Progressing     Problem: Skin/Tissue Integrity - Adult  Goal: Skin integrity remains intact  Outcome: Progressing  Goal: Incisions, wounds, or drain sites healing without S/S of infection  Outcome: Progressing  Goal: Oral mucous membranes remain intact  Outcome: Progressing     Problem: Musculoskeletal - Adult  Goal: Return mobility to safest level of function  Outcome: 
  Problem: Nutrition Deficit:  Goal: Optimize nutritional status  4/17/2024 0921 by Mayra Baxter RN  Outcome: Progressing  4/17/2024 0200 by Cristela Cruz, RN  Outcome: Progressing     Problem: Safety - Adult  Goal: Free from fall injury  4/17/2024 0921 by Mayra Baxter, RN  Outcome: Progressing  4/17/2024 0200 by Cristela Cruz, RN  Outcome: Progressing     
  Problem: Nutrition Deficit:  Goal: Optimize nutritional status  4/17/2024 0946 by Lynne Plummer, MS, RD, LD  Outcome: Progressing  Flowsheets (Taken 4/17/2024 0939)  Nutrient intake appropriate for improving, restoring, or maintaining nutritional needs:   Assess nutritional status and recommend course of action   Monitor oral intake, labs, and treatment plans   Recommend appropriate diets, oral nutritional supplements, and vitamin/mineral supplements  4/17/2024 0921 by Mayra Baxter, RN  Outcome: Progressing  4/17/2024 0200 by Cristela Cruz, RN  Outcome: Progressing     
  Problem: Nutrition Deficit:  Goal: Optimize nutritional status  4/18/2024 0948 by Lynne Plummer, MS, RD, LD  Outcome: Progressing  Flowsheets (Taken 4/18/2024 0938)  Nutrient intake appropriate for improving, restoring, or maintaining nutritional needs:   Assess nutritional status and recommend course of action   Monitor oral intake, labs, and treatment plans   Recommend appropriate diets, oral nutritional supplements, and vitamin/mineral supplements     
SUBJECTIVE:    ED Doc:  Looks rathe dehydrated, Lipase >400, waiting on CT abdomen to result  LA was 3.7, probably from the dehydration but needs F/U with the CT Scan      OBJECTIVE:    BP 98/82   Pulse (!) 107   Temp 98.9 °F (37.2 °C)   Resp 17   Ht 1.651 m (5' 5\")   Wt 37.6 kg (83 lb)   SpO2 100%   BMI 13.81 kg/m²      Latest Reference Range & Units 04/14/24 18:58 04/14/24 19:11 04/14/24 19:38   Sodium 136 - 145 mmol/L 134 (L)     Potassium 3.5 - 5.0 mmol/L 3.1 (L)     Chloride 98 - 111 mmol/L 87 (L)     CO2 22 - 29 mmol/L 24     BUN,BUNPL 6 - 20 mg/dL 32 (H)     Creatinine 0.5 - 0.9 mg/dL 0.7     Anion Gap 7 - 19 mmol/L 23 (H)     Est, Glom Filt Rate >60  >90     Magnesium 1.6 - 2.6 mg/dL 1.6     Lactic Acid, Sepsis 0.5 - 1.9 mg/dL 3.7 (HH)     Glucose, Random 74 - 109 mg/dL 145 (H)     CALCIUM, SERUM, 460591 8.6 - 10.0 mg/dL 10.3 (H)     Total Protein 6.6 - 8.7 g/dL 8.3     Albumin 3.5 - 5.2 g/dL 4.6     Alk Phos 35 - 104 U/L 169 (H)     ALT 5 - 33 U/L 13     AST 5 - 32 U/L 50 (H)     BILIRUBIN TOTAL 0.2 - 1.2 mg/dL 0.8     Lipase 13 - 60 U/L 464 (H)     TSH Reflex FT4 0.27 - 4.20 uIU/mL  3.84    WBC 4.8 - 10.8 K/uL 4.6 (L)     RBC 4.20 - 5.40 M/uL 3.25 (L)     Hemoglobin Quant 12.0 - 16.0 g/dL 11.1 (L)     Hematocrit 37.0 - 47.0 % 32.4 (L)     MCV 81.0 - 99.0 fL 99.7 (H)     MCH 27.0 - 31.0 pg 34.2 (H)     MCHC 33.0 - 37.0 g/dL 34.3     MPV 9.4 - 12.3 fL 11.0     RDW 11.5 - 14.5 % 19.1 (H)     Platelet Count 130 - 400 K/uL 185     Neutrophils/100 leukocytes 50.0 - 65.0 % 79.5 (H)     Lymphocyte % 20.0 - 40.0 % 14.2 (L)     Monocytes % 0.0 - 10.0 % 5.9     Eosinophils % 0.0 - 5.0 % 0.2     Basophils % 0.0 - 1.0 % 0.0     Neutrophils Absolute 1.5 - 7.5 K/uL 3.6     Immature Granulocytes # K/uL 0.0     Lymphocytes Absolute 1.1 - 4.5 K/uL 0.7 (L)     Monocytes Absolute 0.00 - 0.90 K/uL 0.30     Eosinophils Absolute 0.00 - 0.60 K/uL 0.00     Basophils Absolute 0.00 - 0.20 K/uL 0.00     Carboxyhemoglobin %   
SUBJECTIVE:    Hoffman RN updated me: her Father reports he has found ten empty 1 liter liqour bottles under her bed (she had said she does not drink)      OBJECTIVE:    BP 96/73   Pulse (!) 103   Temp 99.3 °F (37.4 °C) (Temporal)   Resp 18   Ht 1.651 m (5' 5\")   Wt 37.6 kg (83 lb)   SpO2 98%   BMI 13.81 kg/m²       ASSESSMENTS & PLANS:    Alcohol Abuse possible physiological dependence:  Admit to medical hoffman with tele  Bed alarm  Fall precautions  Psych consult deferred for now, uncertain if she will be open to addiction treatment as she denied her alcohol usage  Ativan Protocol PRN  Multivitamin and Thiamine and Folic Acid PO  Will provide scheduled ativan 3-day taper at this time, 2mg tablets TID on day 1 and BID on day 2 and once in the AM of day 3  
Absence of cardiac dysrhythmias or at baseline  Outcome: Progressing     Problem: Skin/Tissue Integrity - Adult  Goal: Skin integrity remains intact  Outcome: Progressing  Goal: Incisions, wounds, or drain sites healing without S/S of infection  Outcome: Progressing  Goal: Oral mucous membranes remain intact  Outcome: Progressing     Problem: Musculoskeletal - Adult  Goal: Return mobility to safest level of function  Outcome: Progressing  Goal: Maintain proper alignment of affected body part  Outcome: Progressing  Goal: Return ADL status to a safe level of function  Outcome: Progressing     Problem: Gastrointestinal - Adult  Goal: Minimal or absence of nausea and vomiting  Outcome: Progressing  Goal: Maintains or returns to baseline bowel function  Outcome: Progressing  Goal: Maintains adequate nutritional intake  Outcome: Progressing  Goal: Establish and maintain optimal ostomy function  Outcome: Progressing     Problem: Genitourinary - Adult  Goal: Absence of urinary retention  Outcome: Progressing  Goal: Urinary catheter remains patent  Outcome: Progressing     Problem: Infection - Adult  Goal: Absence of infection at discharge  Outcome: Progressing  Goal: Absence of infection during hospitalization  Outcome: Progressing  Goal: Absence of fever/infection during anticipated neutropenic period  Outcome: Progressing     Problem: Metabolic/Fluid and Electrolytes - Adult  Goal: Electrolytes maintained within normal limits  Outcome: Progressing  Goal: Hemodynamic stability and optimal renal function maintained  Outcome: Progressing  Goal: Glucose maintained within prescribed range  Outcome: Progressing     Problem: Hematologic - Adult  Goal: Maintains hematologic stability  Outcome: Progressing     Problem: Nutrition Deficit:  Goal: Optimize nutritional status  Outcome: Progressing

## 2024-04-19 ENCOUNTER — TELEPHONE (OUTPATIENT)
Dept: INTERNAL MEDICINE | Age: 44
End: 2024-04-19

## 2024-04-19 LAB
BACTERIA BLD CULT ORG #2: NORMAL
BACTERIA BLD CULT: NORMAL

## 2024-04-19 NOTE — TELEPHONE ENCOUNTER
Care Transitions Initial Follow Up Call    Outreach made within 2 business days of discharge: Yes  Workman's comp claim?No  Patient: Kandy Mercado   Patient : 1980   MRN: [id number]    Reason for Admission: Nausea/vomiting  Discharge Date: 24       Spoke with: Patients phone is disconnected.    Discharge department/facility: [facility]    Scheduled appointment with PCP within 7-14 days    Follow Up  Future Appointments   Date Time Provider Department Center   2024  9:00 AM Anita Romero, APRN - CNP [facility]